# Patient Record
Sex: FEMALE | Race: WHITE | URBAN - METROPOLITAN AREA
[De-identification: names, ages, dates, MRNs, and addresses within clinical notes are randomized per-mention and may not be internally consistent; named-entity substitution may affect disease eponyms.]

---

## 2017-01-19 ENCOUNTER — OFFICE VISIT (OUTPATIENT)
Dept: INTERNAL MEDICINE CLINIC | Age: 62
End: 2017-01-19

## 2017-01-19 VITALS
HEIGHT: 64 IN | OXYGEN SATURATION: 98 % | RESPIRATION RATE: 17 BRPM | DIASTOLIC BLOOD PRESSURE: 89 MMHG | HEART RATE: 85 BPM | WEIGHT: 221.6 LBS | BODY MASS INDEX: 37.83 KG/M2 | TEMPERATURE: 98 F | SYSTOLIC BLOOD PRESSURE: 158 MMHG

## 2017-01-19 DIAGNOSIS — M54.2 NECK PAIN ON RIGHT SIDE: ICD-10-CM

## 2017-01-19 DIAGNOSIS — E78.00 PURE HYPERCHOLESTEROLEMIA: Primary | ICD-10-CM

## 2017-01-19 DIAGNOSIS — S93.401A SPRAIN OF RIGHT ANKLE, UNSPECIFIED LIGAMENT, INITIAL ENCOUNTER: ICD-10-CM

## 2017-01-19 DIAGNOSIS — R73.9 ELEVATED BLOOD SUGAR: ICD-10-CM

## 2017-01-19 DIAGNOSIS — Z11.59 NEED FOR HEPATITIS C SCREENING TEST: ICD-10-CM

## 2017-01-19 RX ORDER — NAPROXEN SODIUM 220 MG
220 TABLET ORAL 2 TIMES DAILY WITH MEALS
COMMUNITY
End: 2018-10-03 | Stop reason: ALTCHOICE

## 2017-01-19 RX ORDER — METHYLPREDNISOLONE 4 MG/1
TABLET ORAL
Qty: 1 DOSE PACK | Refills: 0 | Status: SHIPPED | OUTPATIENT
Start: 2017-01-19 | End: 2017-09-12 | Stop reason: ALTCHOICE

## 2017-01-19 NOTE — PROGRESS NOTES
Subjective:   Tracy Hernandez is a 59 y.o. female who is seen for follow up of hyperlipidemia. Cardiovascular risk analysis - 59 y.o. female LDL goal is under 130. Compliance with treatment thus far has been good. ROS: taking medications as instructed, no medication side effects noted, no TIA's, no chest pain on exertion, no dyspnea on exertion, no swelling of ankles, no orthostatic dizziness or lightheadedness, no palpitations. New concerns:   Continued neck pain send middle of November. Right sided with some right jaw numbness but no radiation to arm. Worse turning head to right and looking down. Went to PT with some improvement but numbness has remained the same. Feels medrol dose pack helped some. Low back pain has improved. Feels cymbalta has helped this. Elevated blood pressures in the past.  Not exercising but diet has improved. Denies increased thirst but awakens at night with a dry month. Feel 10 days ago in [facility] rolling right ankle. Swelling but no bruising. Mild pain the first night but improved. Current Outpatient Prescriptions   Medication Sig Dispense Refill    naproxen sodium (ALEVE) 220 mg tablet Take 220 mg by mouth two (2) times daily (with meals).  DULoxetine (CYMBALTA) 60 mg capsule Take 1 Cap by mouth daily. 30 Cap 5    simvastatin (ZOCOR) 20 mg tablet TAKE 1 TABLET BY MOUTH EVERY NIGHT 30 Tab 11    triamcinolone (NASACORT AQ) 55 mcg nasal inhaler 2 Sprays daily.  Cholecalciferol, Vitamin D3, (VITAMIN D3) 1,000 unit cap Take 4,000 Units by mouth daily.  fexofenadine (ALLEGRA) 180 mg tablet Take  by mouth daily.  aspirin 81 mg tablet Take 81 mg by mouth.         Lab Results  Component Value Date/Time   Cholesterol, total 210 07/12/2016 09:02 AM   HDL Cholesterol 51 07/12/2016 09:02 AM   LDL, calculated 135 07/12/2016 09:02 AM   Triglyceride 118 07/12/2016 09:02 AM       Lab Results  Component Value Date/Time   ALT 21 07/12/2016 09:02 AM   AST 20 07/12/2016 09:02 AM   Alk. phosphatase 94 07/12/2016 09:02 AM   Bilirubin, direct 0.09 06/24/2015 10:03 AM   Bilirubin, total 0.4 07/12/2016 09:02 AM          Objective:     Visit Vitals    /89 (BP 1 Location: Left arm, BP Patient Position: Sitting)    Pulse 85    Temp 98 °F (36.7 °C) (Oral)    Resp 17    Ht 5' 4\" (1.626 m)    Wt 221 lb 9.6 oz (100.5 kg)    SpO2 98%    BMI 38.04 kg/m2      Appearance: alert, well appearing, and in no distress. CVS exam       Assessment/Plan:   Hyperlipidemia well controlled. current treatment plan is effective, no change in therapy   Check labs    Right neck strain with radiculopathy in trigeminal nerve distribution. Discussed and recommended MRI - pt declined at this time  Would like to retry medrol dose pack and continue PT at this time. Right ankle sprain - mild  Elevate, gentle stretching    Elevated blood pressure today - has not been exercises. Will restart    Low back pain - improved. Elevated blood pressure - repeat labs    HM- hep C screening, will make an appointment with gyn for pap and mammogram.    .  Orders Placed This Encounter    METABOLIC PANEL, COMPREHENSIVE    LIPID PANEL    HEMOGLOBIN A1C WITH EAG    HEPATITIS C AB    naproxen sodium (ALEVE) 220 mg tablet    methylPREDNISolone (MEDROL, AMBER,) 4 mg tablet     Follow-up Disposition:  Return in about 6 months (around 7/19/2017) for hyperlipidemia, elevated bs. Kassandra Echeverria

## 2017-01-20 LAB
ALBUMIN SERPL-MCNC: 4.6 G/DL (ref 3.6–4.8)
ALBUMIN/GLOB SERPL: 1.5 {RATIO} (ref 1.1–2.5)
ALP SERPL-CCNC: 99 IU/L (ref 39–117)
ALT SERPL-CCNC: 23 IU/L (ref 0–32)
AST SERPL-CCNC: 23 IU/L (ref 0–40)
BILIRUB SERPL-MCNC: 0.4 MG/DL (ref 0–1.2)
BUN SERPL-MCNC: 19 MG/DL (ref 8–27)
BUN/CREAT SERPL: 23 (ref 11–26)
CALCIUM SERPL-MCNC: 9.7 MG/DL (ref 8.7–10.3)
CHLORIDE SERPL-SCNC: 97 MMOL/L (ref 96–106)
CHOLEST SERPL-MCNC: 211 MG/DL (ref 100–199)
CO2 SERPL-SCNC: 28 MMOL/L (ref 18–29)
CREAT SERPL-MCNC: 0.83 MG/DL (ref 0.57–1)
EST. AVERAGE GLUCOSE BLD GHB EST-MCNC: 131 MG/DL
GLOBULIN SER CALC-MCNC: 3 G/DL (ref 1.5–4.5)
GLUCOSE SERPL-MCNC: 102 MG/DL (ref 65–99)
HBA1C MFR BLD: 6.2 % (ref 4.8–5.6)
HCV AB S/CO SERPL IA: 0.3 S/CO RATIO (ref 0–0.9)
HDLC SERPL-MCNC: 59 MG/DL
INTERPRETATION, 910389: NORMAL
LDLC SERPL CALC-MCNC: 135 MG/DL (ref 0–99)
POTASSIUM SERPL-SCNC: 5.2 MMOL/L (ref 3.5–5.2)
PROT SERPL-MCNC: 7.6 G/DL (ref 6–8.5)
SODIUM SERPL-SCNC: 140 MMOL/L (ref 134–144)
TRIGL SERPL-MCNC: 87 MG/DL (ref 0–149)
VLDLC SERPL CALC-MCNC: 17 MG/DL (ref 5–40)

## 2017-06-14 RX ORDER — SIMVASTATIN 20 MG/1
TABLET, FILM COATED ORAL
Qty: 30 TAB | Refills: 11 | Status: SHIPPED | OUTPATIENT
Start: 2017-06-14 | End: 2017-11-07 | Stop reason: SDUPTHER

## 2017-06-14 RX ORDER — DULOXETIN HYDROCHLORIDE 60 MG/1
60 CAPSULE, DELAYED RELEASE ORAL DAILY
Qty: 30 CAP | Refills: 3 | Status: SHIPPED | OUTPATIENT
Start: 2017-06-14 | End: 2017-10-05 | Stop reason: SDUPTHER

## 2017-09-12 ENCOUNTER — OFFICE VISIT (OUTPATIENT)
Dept: INTERNAL MEDICINE CLINIC | Age: 62
End: 2017-09-12

## 2017-09-12 VITALS
WEIGHT: 223 LBS | HEART RATE: 79 BPM | RESPIRATION RATE: 18 BRPM | TEMPERATURE: 97.9 F | BODY MASS INDEX: 38.07 KG/M2 | DIASTOLIC BLOOD PRESSURE: 79 MMHG | OXYGEN SATURATION: 98 % | HEIGHT: 64 IN | SYSTOLIC BLOOD PRESSURE: 115 MMHG

## 2017-09-12 DIAGNOSIS — E78.00 PURE HYPERCHOLESTEROLEMIA: ICD-10-CM

## 2017-09-12 DIAGNOSIS — R73.9 ELEVATED BLOOD SUGAR: ICD-10-CM

## 2017-09-12 DIAGNOSIS — Z00.00 ROUTINE GENERAL MEDICAL EXAMINATION AT A HEALTH CARE FACILITY: Primary | ICD-10-CM

## 2017-09-12 NOTE — PROGRESS NOTES
Subjective:   57 y.o. female for Well Woman Check. Her gyne and breast care is done elsewhere by her Ob-Gyne physician. Patient Active Problem List    Diagnosis Date Noted    Elevated blood sugar 2017    Advance directive discussed with patient 2016    Hyperlipemia 2011    Panic attacks 2011     Current Outpatient Prescriptions   Medication Sig Dispense Refill    simvastatin (ZOCOR) 20 mg tablet TAKE 1 TABLET BY MOUTH EVERY NIGHT 30 Tab 11    DULoxetine (CYMBALTA) 60 mg capsule Take 1 Cap by mouth daily. 30 Cap 3    naproxen sodium (ALEVE) 220 mg tablet Take 220 mg by mouth two (2) times daily (with meals).  triamcinolone (NASACORT AQ) 55 mcg nasal inhaler 2 Sprays daily.  Cholecalciferol, Vitamin D3, (VITAMIN D3) 1,000 unit cap Take 4,000 Units by mouth daily.  fexofenadine (ALLEGRA) 180 mg tablet Take  by mouth daily.  aspirin 81 mg tablet Take 81 mg by mouth.        No Known Allergies  Past Medical History:   Diagnosis Date    Hypercholesterolemia     Panic attacks      Past Surgical History:   Procedure Laterality Date    ENDOSCOPY, COLON, DIAGNOSTIC       Family History   Problem Relation Age of Onset    Cancer Mother      lung ( @ 67)   [facility] Obesity Mother     Hypertension Mother     High Cholesterol Mother     Elevated Lipids Mother     Cancer Father      prostate    Parkinsonism Father      Social History   Substance Use Topics    Smoking status: Former Smoker     Packs/day: 20.00     Types: Cigarettes    Smokeless tobacco: Never Used    Alcohol use 0.0 - 0.6 oz/week     0 - 1 Standard drinks or equivalent per week        Lab Results  Component Value Date/Time   WBC 7.5 2017 11:21 AM   HGB 13.9 2017 11:21 AM   HCT 43.3 2017 11:21 AM   PLATELET 964  11:21 AM   MCV 89 2017 11:21 AM     Lab Results  Component Value Date/Time   Hemoglobin A1c 5.9 2017 11:21 AM   Hemoglobin A1c 6.2 2017 12:09 PM Glucose 96 09/08/2017 11:21 AM   LDL, calculated 106 09/08/2017 11:21 AM   Creatinine 0.87 09/08/2017 11:21 AM      Lab Results  Component Value Date/Time   Cholesterol, total 177 09/08/2017 11:21 AM   HDL Cholesterol 53 09/08/2017 11:21 AM   LDL, calculated 106 09/08/2017 11:21 AM   Triglyceride 90 09/08/2017 11:21 AM   Lab Results  Component Value Date/Time   ALT (SGPT) 18 09/08/2017 11:21 AM   AST (SGOT) 19 09/08/2017 11:21 AM   Alk. phosphatase 92 09/08/2017 11:21 AM   Bilirubin, direct 0.09 06/24/2015 10:03 AM   Bilirubin, total 0.4 09/08/2017 11:21 AM   Albumin 4.3 09/08/2017 11:21 AM   Protein, total 7.4 09/08/2017 11:21 AM   PLATELET 092 70/31/9900 11:21 AM       Lab Results  Component Value Date/Time   GFR est non-AA 71 09/08/2017 11:21 AM   GFR est AA 81 09/08/2017 11:21 AM   Creatinine 0.87 09/08/2017 11:21 AM   BUN 15 09/08/2017 11:21 AM   Sodium 144 09/08/2017 11:21 AM   Potassium 5.2 09/08/2017 11:21 AM   Chloride 99 09/08/2017 11:21 AM   CO2 27 09/08/2017 11:21 AM   Lab Results  Component Value Date/Time   TSH 1.370 09/08/2017 11:21 AM       Specific concerns today: Cholesterol and blood sugar has improved on labs from 7 months ago. Trying to cut back on caloric intake. Just got a puppy so will be walking more. New spots on bottom lip. No bleeding. Has seen Dr. Kirt Mobley in the past but hasn't seen for years. Last pap 12/2016? With Dr. Dagoberto Espinal      Review of Systems  Constitutional: negative  Eyes: negative except for contacts/glasses and last eye exam 2-3 years ago  Ears, nose, mouth, throat, and face: negative  Respiratory: negative  Cardiovascular: negative  Gastrointestinal: negative except for reflux symptoms  Genitourinary:negative  Integument/breast: negative  Hematologic/lymphatic: negative  Musculoskeletal:negative except for bilat knee pain, left worse than right. Aching alot - advil help  Neurological: negative except for left hand numbness - when grasping.   used to sleep with a brace which helps. Feet numb in winter when cold or shoes too tight. Behavioral/Psych: negative  Endocrine: negative  Allergic/Immunologic: negative except for allergies - controlled with current meds    Objective:   Blood pressure 115/79, pulse 79, temperature 97.9 °F (36.6 °C), temperature source Oral, resp. rate 18, height 5' 4\" (1.626 m), weight 223 lb (101.2 kg), SpO2 98 %.    Physical Examination:   General appearance - alert, well appearing, and in no distress and oriented to person, place, and time  Mental status - alert, oriented to person, place, and time, normal mood, behavior, speech, dress, motor activity, and thought processes  Eyes - pupils equal and reactive, extraocular eye movements intact  Ears - bilateral TM's and external ear canals normal  Nose - normal and patent, no erythema, discharge or polyps  Mouth - mucous membranes moist, pharynx normal without lesions  Neck - supple, no significant adenopathy, carotids upstroke normal bilaterally, no bruits, thyroid exam: thyroid is normal in size without nodules or tenderness  Lymphatics - no palpable lymphadenopathy, no hepatosplenomegaly  Chest - clear to auscultation, no wheezes, rales or rhonchi, symmetric air entry  Heart - normal rate, regular rhythm, normal S1, S2, no murmurs, rubs, clicks or gallops  Abdomen - soft, nontender, nondistended, no masses or organomegaly  bowel sounds normal  Breasts - breasts appear normal, no suspicious masses, no skin or nipple changes or axillary nodes  Back exam - full range of motion, no tenderness, palpable spasm or pain on motion  Neurological - alert, oriented, normal speech, no focal findings or movement disorder noted  Musculoskeletal - no joint tenderness, deformity or swelling  Extremities - peripheral pulses normal, no pedal edema, no clubbing or cyanosis  Skin - normal coloration and turgor, no rashes, no suspicious skin lesions noted     Assessment/Plan:   56yo female  Elevated bs - improved, discussed continuing better diet and exercise  Hyperlipidemia - controlled, cont same  Anxiety - controlled, cont same  Obesity - discussed weight loss through diet and exercise  Skin changes on lip - referred back to derm. call if any problems  Labs prior to next visit. Orders Placed This Encounter    METABOLIC PANEL, COMPREHENSIVE    LIPID PANEL    HEMOGLOBIN A1C WITH EAG     Follow-up Disposition:  Return in about 6 months (around 3/12/2018) for hyperlipidemia.

## 2017-11-07 RX ORDER — SIMVASTATIN 20 MG/1
TABLET, FILM COATED ORAL
Qty: 90 TAB | Refills: 3 | Status: SHIPPED | OUTPATIENT
Start: 2017-11-07 | End: 2018-11-15 | Stop reason: SDUPTHER

## 2017-11-07 NOTE — TELEPHONE ENCOUNTER
Last seen: 09/12/17    Requested Prescriptions     Pending Prescriptions Disp Refills    simvastatin (ZOCOR) 20 mg tablet 90 Tab 3     Sig: TAKE 1 TABLET BY MOUTH EVERY NIGHT

## 2018-03-08 LAB
ALBUMIN SERPL-MCNC: 4.3 G/DL (ref 3.6–4.8)
ALBUMIN/GLOB SERPL: 1.5 {RATIO} (ref 1.2–2.2)
ALP SERPL-CCNC: 97 IU/L (ref 39–117)
ALT SERPL-CCNC: 17 IU/L (ref 0–32)
AST SERPL-CCNC: 21 IU/L (ref 0–40)
BILIRUB SERPL-MCNC: 0.4 MG/DL (ref 0–1.2)
BUN SERPL-MCNC: 14 MG/DL (ref 8–27)
BUN/CREAT SERPL: 18 (ref 12–28)
CALCIUM SERPL-MCNC: 9.6 MG/DL (ref 8.7–10.3)
CHLORIDE SERPL-SCNC: 99 MMOL/L (ref 96–106)
CHOLEST SERPL-MCNC: 194 MG/DL (ref 100–199)
CO2 SERPL-SCNC: 27 MMOL/L (ref 18–29)
CREAT SERPL-MCNC: 0.8 MG/DL (ref 0.57–1)
EST. AVERAGE GLUCOSE BLD GHB EST-MCNC: 123 MG/DL
GFR SERPLBLD CREATININE-BSD FMLA CKD-EPI: 78 ML/MIN/1.73
GFR SERPLBLD CREATININE-BSD FMLA CKD-EPI: 90 ML/MIN/1.73
GLOBULIN SER CALC-MCNC: 2.9 G/DL (ref 1.5–4.5)
GLUCOSE SERPL-MCNC: 119 MG/DL (ref 65–99)
HBA1C MFR BLD: 5.9 % (ref 4.8–5.6)
HDLC SERPL-MCNC: 49 MG/DL
INTERPRETATION, 910389: NORMAL
LDLC SERPL CALC-MCNC: 126 MG/DL (ref 0–99)
POTASSIUM SERPL-SCNC: 5 MMOL/L (ref 3.5–5.2)
PROT SERPL-MCNC: 7.2 G/DL (ref 6–8.5)
SODIUM SERPL-SCNC: 141 MMOL/L (ref 134–144)
TRIGL SERPL-MCNC: 94 MG/DL (ref 0–149)
VLDLC SERPL CALC-MCNC: 19 MG/DL (ref 5–40)

## 2018-03-12 DIAGNOSIS — R73.9 ELEVATED BLOOD SUGAR: ICD-10-CM

## 2018-03-12 DIAGNOSIS — E78.00 PURE HYPERCHOLESTEROLEMIA: ICD-10-CM

## 2018-03-27 ENCOUNTER — OFFICE VISIT (OUTPATIENT)
Dept: INTERNAL MEDICINE CLINIC | Age: 63
End: 2018-03-27

## 2018-03-27 VITALS
HEART RATE: 87 BPM | SYSTOLIC BLOOD PRESSURE: 155 MMHG | BODY MASS INDEX: 38.38 KG/M2 | DIASTOLIC BLOOD PRESSURE: 87 MMHG | WEIGHT: 224.8 LBS | TEMPERATURE: 98.5 F | OXYGEN SATURATION: 98 % | RESPIRATION RATE: 18 BRPM | HEIGHT: 64 IN

## 2018-03-27 DIAGNOSIS — J02.9 SORE THROAT: Primary | ICD-10-CM

## 2018-03-27 DIAGNOSIS — R73.9 ELEVATED BLOOD SUGAR: ICD-10-CM

## 2018-03-27 DIAGNOSIS — E78.00 PURE HYPERCHOLESTEROLEMIA: ICD-10-CM

## 2018-03-27 LAB
S PYO AG THROAT QL: NEGATIVE
VALID INTERNAL CONTROL?: YES

## 2018-03-27 RX ORDER — OMEPRAZOLE 20 MG/1
20 CAPSULE, DELAYED RELEASE ORAL DAILY
COMMUNITY

## 2018-03-27 NOTE — MR AVS SNAPSHOT
[address] [place] [address] [address] [facility] 
[phone] Patient: Pita Parish MRN: [id number] VOA:[birth year] Visit Information Date & Time Provider Department Dept. Phone Encounter #  
 3/27/2018 10:00 AM Lalo Dunn MD [facility] [phone] [id number] Follow-up Instructions Return in about 6 months (around 9/27/2018) for hyperlipidemia, elevated blood sugar. Primitivo Mishra Upcoming Health Maintenance Date Due  
 PAP AKA CERVICAL CYTOLOGY 8/12/2016 Influenza Age 5 to Adult 8/1/2017 BREAST CANCER SCRN MAMMOGRAM 1/20/2018 GLAUCOMA SCREENING Q2Y 3/8/2018 Bone Densitometry (Dexa) Screening 3/8/2018 Pneumococcal 65+ Low/Medium Risk (1 of 2 - PCV13) 3/8/2018 DTaP/Tdap/Td series (2 - Td) 11/21/2021 COLONOSCOPY 9/29/2024 Allergies as of 3/27/2018  Review Complete On: 3/27/2018 By: Lalo Dunn MD  
 No Known Allergies Current Immunizations  Reviewed on 12/12/2016 Name Date Influenza Vaccine 11/15/2017, 12/8/2016, 12/1/2015, 11/19/2014, 10/9/2013 Influenza Vaccine Whole 10/5/2011, 11/2/2010 TDAP Vaccine 11/21/2011 12:11 PM  
 Zoster Vaccine, Live 6/9/2013 Not reviewed this visit You Were Diagnosed With   
  
 Codes Comments Sore throat    -  Primary ICD-10-CM: J02.9 ICD-9-CM: 812 Elevated blood sugar     ICD-10-CM: R73.9 ICD-9-CM: 790.29 Pure hypercholesterolemia     ICD-10-CM: E78.00 ICD-9-CM: 272.0 Vitals BP Pulse Temp Resp Height(growth percentile) Weight(growth percentile) 155/87 (BP 1 Location: Right arm, BP Patient Position: Sitting) 87 98.5 °F (36.9 °C) (Oral) 18 5' 4\" (1.626 m) 224 lb 12.8 oz (102 kg) SpO2 BMI OB Status Smoking Status 98% 38.59 kg/m2 Postmenopausal Former Smoker Vitals History BMI and BSA Data Body Mass Index Body Surface Area 38.59 kg/m 2 2.15 m 2 Preferred Pharmacy Pharmacy Name Phone [facility] #[id number] Your Updated Medication List  
  
   
This list is accurate as of 3/27/18 11:05 AM.  Always use your most recent med list.  
  
  
  
  
 ALEVE 220 mg tablet Generic drug:  naproxen sodium Take 220 mg by mouth two (2) times daily (with meals). aspirin 81 mg tablet Take 81 mg by mouth. DULoxetine 60 mg capsule Commonly known as:  CYMBALTA TAKE ONE CAPSULE BY MOUTH EVERY DAY  
  
 NASACORT AQ 55 mcg nasal inhaler Generic drug:  triamcinolone 2 Sprays daily. PriLOSEC 20 mg capsule Generic drug:  omeprazole Take 20 mg by mouth daily. simvastatin 20 mg tablet Commonly known as:  ZOCOR  
TAKE 1 TABLET BY MOUTH EVERY NIGHT  
  
 VITAMIN D3 1,000 unit Cap Generic drug:  cholecalciferol Take 4,000 Units by mouth daily. ZyrTEC 10 mg Cap Generic drug:  Cetirizine Take  by mouth. We Performed the Following AMB POC RAPID STREP A [36293 CPT(R)] Follow-up Instructions Return in about 6 months (around 9/27/2018) for hyperlipidemia, elevated blood sugar. .  
  
  
Patient Instructions Continue Zyrtec and Nasacort. Add Mucinex 1200mg twice a day (plain not D or DM) Add saline nasal spray 2 squirts each nostril 2-3 times a day. Introducing [facility]! Dear Minal Ramon: 
Thank you for requesting a [facility] account. Our records indicate that you already have an active [employer] account. You can access your account anytime at [email or web address] Did you know that you can access your hospital and ER discharge instructions at any time in [employer]? You can also review all of your test results from your hospital stay or ER visit. Additional Information If you have questions, please visit the Frequently Asked Questions section of the [employer] website at [email or web address] Remember, MyChart is NOT to be used for urgent needs. For medical emergencies, dial 911. Now available from your iPhone and Android! Please provide this summary of care documentation to your next provider. Your primary care clinician is listed as RODOLFO OLVERA. If you have any questions after today's visit, please call [phone].

## 2018-03-27 NOTE — PATIENT INSTRUCTIONS
Continue Zyrtec and Nasacort. Add Mucinex 1200mg twice a day (plain not D or DM)  Add saline nasal spray 2 squirts each nostril 2-3 times a day.

## 2018-03-27 NOTE — PROGRESS NOTES
Subjective:   Mingo Saldana is a 70 y.o. female who is seen for follow up of hyperlipidemia and elevated blood sugars. Cardiovascular risk analysis - 70 y.o. female LDL goal is under 130. Compliance with treatment thus far has been good. ROS: taking medications as instructed, no medication side effects noted, no TIA's, no chest pain on exertion, no dyspnea on exertion, no swelling of ankles, no orthostatic dizziness or lightheadedness, no palpitations. New concerns:   Started 3/23 with severe sore throat and post nasal drainage. Drainage is clear. Mild cough. Initial fever but none since that first day. Taking Aleve but no other otc medications. .   Elevated BS - dry month at night but relates to Zyrtec. Denies increased urination, numbness in feet or vision changes. A1C is stable at 5.9%  Blood pressure elevated today. Nml 2months ago at gyn Magdalenohumaira Bryant) 120/70  Obesity - not working to lose at this time. Walking dog (mini labradoodle) for exercise. Current Outpatient Prescriptions   Medication Sig Dispense Refill    Cetirizine (ZYRTEC) 10 mg cap Take  by mouth.  omeprazole (PRILOSEC) 20 mg capsule Take 20 mg by mouth daily.  simvastatin (ZOCOR) 20 mg tablet TAKE 1 TABLET BY MOUTH EVERY NIGHT 90 Tab 3    DULoxetine (CYMBALTA) 60 mg capsule TAKE ONE CAPSULE BY MOUTH EVERY DAY 30 Cap 11    naproxen sodium (ALEVE) 220 mg tablet Take 220 mg by mouth two (2) times daily (with meals).  triamcinolone (NASACORT AQ) 55 mcg nasal inhaler 2 Sprays daily.  Cholecalciferol, Vitamin D3, (VITAMIN D3) 1,000 unit cap Take 4,000 Units by mouth daily.  aspirin 81 mg tablet Take 81 mg by mouth.         Lab Results  Component Value Date/Time   Cholesterol, total 194 03/07/2018 09:04 AM   HDL Cholesterol 49 03/07/2018 09:04 AM   LDL, calculated 126 (H) 03/07/2018 09:04 AM   Triglyceride 94 03/07/2018 09:04 AM     Lab Results  Component Value Date/Time   ALT (SGPT) 17 03/07/2018 09:04 AM AST (SGOT) 21 03/07/2018 09:04 AM   Alk. phosphatase 97 03/07/2018 09:04 AM   Bilirubin, direct 0.09 06/24/2015 10:03 AM   Bilirubin, total 0.4 03/07/2018 09:04 AM   Albumin 4.3 03/07/2018 09:04 AM   Protein, total 7.2 03/07/2018 09:04 AM   PLATELET 451 84/54/7253 11:21 AM          Objective:     Visit Vitals    /87 (BP 1 Location: Right arm, BP Patient Position: Sitting)    Pulse 87    Temp 98.5 °F (36.9 °C) (Oral)    Resp 18    Ht 5' 4\" (1.626 m)    Wt 224 lb 12.8 oz (102 kg)    SpO2 98%    BMI 38.59 kg/m2      Appearance: alert, well appearing, and in no distress and oriented to person, place, and time. CVS exam   Nares - clear dischage  Sinuses nontender  OP - post erythema, ulceration upper soft palate. Tonsils are not enlarged and without exudate  NECK: supple, no lad, no bruit, no tm  LUNGS: cta bilat  CV rrr, no m/g/r  ABD: soft, nt, nd, nabs  EXT: no c/c/e. Tatyana Tobar Assessment/Plan:   Hyperlipidemia well controlled. current treatment plan is effective, no change in therapy. Elevated blood sugar - controlled  Continue to work on diet and exercise    Obesity -discussed weight loss through diet and exercise    Viral uri - mucinex and saline ns. Discussed no need for abx. Strep test negative. Orders Placed This Encounter    AMB POC RAPID STREP A    Cetirizine (ZYRTEC) 10 mg cap    omeprazole (PRILOSEC) 20 mg capsule     Follow-up Disposition:  Return in about 6 months (around 9/27/2018) for hyperlipidemia, elevated blood sugar.   ..

## 2018-03-27 NOTE — PROGRESS NOTES
Chief Complaint   Patient presents with    Follow Up Chronic Condition     Lipids     1. Have you been to the ER, urgent care clinic since your last visit? Hospitalized since your last visit? No    2. Have you seen or consulted any other health care providers outside of the [address] [place] [address] since your last visit? Include any pap smears or colon screening. Pap done by Dr. Tracey Floyd- 2 months ago, due for mammo and bone density.

## 2018-10-03 ENCOUNTER — OFFICE VISIT (OUTPATIENT)
Dept: INTERNAL MEDICINE CLINIC | Age: 63
End: 2018-10-03

## 2018-10-03 VITALS
RESPIRATION RATE: 18 BRPM | HEIGHT: 64 IN | WEIGHT: 226.4 LBS | OXYGEN SATURATION: 95 % | BODY MASS INDEX: 38.65 KG/M2 | SYSTOLIC BLOOD PRESSURE: 132 MMHG | DIASTOLIC BLOOD PRESSURE: 75 MMHG | TEMPERATURE: 97.4 F | HEART RATE: 79 BPM

## 2018-10-03 DIAGNOSIS — E66.01 SEVERE OBESITY (HCC): ICD-10-CM

## 2018-10-03 DIAGNOSIS — Z23 ENCOUNTER FOR IMMUNIZATION: ICD-10-CM

## 2018-10-03 DIAGNOSIS — Z00.00 ROUTINE GENERAL MEDICAL EXAMINATION AT A HEALTH CARE FACILITY: Primary | ICD-10-CM

## 2018-10-03 RX ORDER — DEXTROMETHORPHAN HYDROBROMIDE, GUAIFENESIN 5; 100 MG/5ML; MG/5ML
650 LIQUID ORAL
COMMUNITY

## 2018-10-03 NOTE — MR AVS SNAPSHOT
[address] [place] [address] Drive [address] [place] [address] 
[phone] Patient: Jared Swan MRN: [id number] ZUP:0/1/4277 Visit Information Date & Time Provider Department Dept. Phone Encounter #  
 10/3/2018  9:20 Mackenzie Mosqueda MD Counts include 234 beds at the [facility] [phone] [id number] Upcoming Health Maintenance Date Due Shingrix Vaccine Age 50> (1 of 2) 3/8/2003 BREAST CANCER SCRN MAMMOGRAM 1/20/2018 GLAUCOMA SCREENING Q2Y 3/8/2018 Bone Densitometry (Dexa) Screening 3/8/2018 Pneumococcal 65+ Low/Medium Risk (1 of 2 - PCV13) 3/8/2018 Influenza Age 5 to Adult 8/1/2018 DTaP/Tdap/Td series (2 - Td) 11/21/2021 COLONOSCOPY 9/29/2024 Allergies as of 10/3/2018  Review Complete On: 10/3/2018 By: Nga Palma MD  
 No Known Allergies Current Immunizations  Reviewed on 12/12/2016 Name Date Influenza Vaccine 11/15/2017, 12/8/2016, 12/1/2015, 11/19/2014, 10/9/2013 Influenza Vaccine (Quad) PF  Incomplete Influenza Vaccine Whole 10/5/2011, 11/2/2010 TDAP Vaccine 11/21/2011 12:11 PM  
 Zoster Vaccine, Live 6/9/2013 Not reviewed this visit You Were Diagnosed With   
  
 Codes Comments Routine general medical examination at a health care facility    -  Primary ICD-10-CM: Z00.00 ICD-9-CM: V70.0 Encounter for immunization     ICD-10-CM: T13 ICD-9-CM: V03.89 Vitals BP Pulse Temp Resp Height(growth percentile) Weight(growth percentile) 132/75 79 97.4 °F (36.3 °C) (Oral) 18 5' 4\" (1.626 m) 226 lb 6.4 oz (102.7 kg) SpO2 BMI OB Status Smoking Status 95% 38.86 kg/m2 Postmenopausal Former Smoker Vitals History BMI and BSA Data Body Mass Index Body Surface Area  
 38.86 kg/m 2 2.15 m 2 Preferred Pharmacy Pharmacy Name Phone [id number] [address] Your Updated Medication List  
  
   
 This list is accurate as of 10/3/18 10:05 AM.  Always use your most recent med list.  
  
  
  
  
 aspirin 81 mg tablet Take 81 mg by mouth. DULoxetine 60 mg capsule Commonly known as:  CYMBALTA TAKE ONE CAPSULE BY MOUTH EVERY DAY  
  
 NASACORT AQ 55 mcg nasal inhaler Generic drug:  triamcinolone 2 Sprays daily. pneumococcal 13 audrey conj dip 0.5 mL Syrg injection Commonly known as:  PREVNAR 13 (PF)  
0.5 mL by IntraMUSCular route PRIOR TO DISCHARGE for 1 dose. PriLOSEC 20 mg capsule Generic drug:  omeprazole Take 20 mg by mouth daily. simvastatin 20 mg tablet Commonly known as:  ZOCOR  
TAKE 1 TABLET BY MOUTH EVERY NIGHT  
  
 TYLENOL ARTHRITIS PAIN 650 mg Tber Generic drug:  acetaminophen Take 650 mg by mouth two (2) times a day. varicella-zoster recombinant (PF) 50 mcg/0.5 mL Susr injection Commonly known as:  SHINGRIX (PF)  
0.5 mL by IntraMUSCular route once for 1 dose. Repeat x 1 in 2-6 months VITAMIN D3 1,000 unit Cap Generic drug:  cholecalciferol Take 4,000 Units by mouth daily. ZyrTEC 10 mg Cap Generic drug:  Cetirizine Take  by mouth. Prescriptions Printed Refills  
 varicella-zoster recombinant, PF, (SHINGRIX, PF,) 50 mcg/0.5 mL susr injection 1 Si.5 mL by IntraMUSCular route once for 1 dose. Repeat x 1 in 2-6 months Class: Print Route: IntraMUSCular  
 pneumococcal 13 audrey conj dip (PREVNAR 13, PF,) 0.5 mL syrg injection 0 Si.5 mL by IntraMUSCular route PRIOR TO DISCHARGE for 1 dose. Class: Print Route: IntraMUSCular We Performed the Following CBC W/O DIFF [23646 CPT(R)] HEMOGLOBIN A1C WITH EAG [39199 CPT(R)] INFLUENZA VIRUS VAC QUAD,SPLIT,PRESV FREE SYRINGE IM [id number] CPT(R)] LIPID PANEL [98486 CPT(R)] METABOLIC PANEL, COMPREHENSIVE [26411 CPT(R)] TSH 3RD GENERATION [72388 CPT(R)] VITAMIN D, 25 HYDROXY [id number] CPT(R)] Introducing [facility]! Dear Dwight Davila: 
Thank you for requesting a [employer] account. Our records indicate that you already have an active [employer] account. You can access your account anytime at [email or web address] Did you know that you can access your hospital and ER discharge instructions at any time in [employer]? You can also review all of your test results from your hospital stay or ER visit. Additional Information If you have questions, please visit the Frequently Asked Questions section of the [employer] website at [email or web address] Remember, [employer] is NOT to be used for urgent needs. For medical emergencies, dial 911. Now available from your iPhone and Android! Please provide this summary of care documentation to your next provider. Your primary care clinician is listed as RODOLFO OLVERA. If you have any questions after today's visit, please call [phone].

## 2018-10-03 NOTE — PROGRESS NOTES
Subjective:   70 y.o. female for Well Woman Check. Her gyne and breast care is done elsewhere by her Ob-Gyne physician. Saw Dr. Luca Blanca this year. She has scheduled her for a mammogram and DXA but not yet completed. Patient Active Problem List    Diagnosis Date Noted    Severe obesity ([address] 10/03/2018    Elevated blood sugar 2017    Advance directive discussed with patient 2016    Hyperlipemia 2011    Panic attacks 2011     Current Outpatient Prescriptions   Medication Sig Dispense Refill    acetaminophen (TYLENOL ARTHRITIS PAIN) 650 mg TbER Take 650 mg by mouth two (2) times a day.  varicella-zoster recombinant, PF, (SHINGRIX, PF,) 50 mcg/0.5 mL susr injection 0.5 mL by IntraMUSCular route once for 1 dose. Repeat x 1 in 2-6 months 0.5 mL 1    pneumococcal 13 audrey conj dip (PREVNAR 13, PF,) 0.5 mL syrg injection 0.5 mL by IntraMUSCular route PRIOR TO DISCHARGE for 1 dose. 0.5 mL 0    DULoxetine (CYMBALTA) 60 mg capsule TAKE ONE CAPSULE BY MOUTH EVERY DAY 30 Cap 11    Cetirizine (ZYRTEC) 10 mg cap Take  by mouth.  omeprazole (PRILOSEC) 20 mg capsule Take 20 mg by mouth daily.  simvastatin (ZOCOR) 20 mg tablet TAKE 1 TABLET BY MOUTH EVERY NIGHT 90 Tab 3    triamcinolone (NASACORT AQ) 55 mcg nasal inhaler 2 Sprays daily.  Cholecalciferol, Vitamin D3, (VITAMIN D3) 1,000 unit cap Take 4,000 Units by mouth daily.  aspirin 81 mg tablet Take 81 mg by mouth.        No Known Allergies  Past Medical History:   Diagnosis Date    Hypercholesterolemia     Panic attacks      Past Surgical History:   Procedure Laterality Date    ENDOSCOPY, COLON, DIAGNOSTIC  , 2014    Brand     Family History   Problem Relation Age of Onset    Cancer Mother      lung ( @ 67)   Orlene Gala Obesity Mother     Hypertension Mother     High Cholesterol Mother     Elevated Lipids Mother     Cancer Father      prostate    Parkinsonism Father      Social History   Substance Use Topics  Smoking status: Former Smoker     Packs/day: 20.00     Types: Cigarettes    Smokeless tobacco: Never Used    Alcohol use No             Specific concerns today:   Increasing arthritis in left knee. Has seen Dr. Lm An in the past.  Pain in medial knee and stiffness. Review of Systems  Constitutional: negative  Eyes: negative  Ears, nose, mouth, throat, and face: negative  Respiratory: negative  Cardiovascular: negative except for palpitations  Gastrointestinal: negative except for indigestion when taking too much aleve - stopped and started some prilosec. Genitourinary:negative  Integument/breast: negative  Hematologic/lymphatic: negative  Musculoskeletal:negative except for left knee  Neurological: negative  Behavioral/Psych: negative  Endocrine: negative  Allergic/Immunologic: negative  Dry mouth at night post taking antihistamine. Objective:   Blood pressure 132/75, pulse 79, temperature 97.4 °F (36.3 °C), temperature source Oral, resp. rate 18, height 5' 4\" (1.626 m), weight 226 lb 6.4 oz (102.7 kg), SpO2 95 %.    Physical Examination:   General appearance - alert, well appearing, and in no distress and overweight  Mental status - alert, oriented to person, place, and time  Eyes - pupils equal and reactive, extraocular eye movements intact  Ears - bilateral TM's and external ear canals normal  Nose - normal and patent, no erythema, discharge or polyps  Mouth - mucous membranes moist, pharynx normal without lesions  Neck - supple, no significant adenopathy, carotids upstroke normal bilaterally, no bruits, thyroid exam: thyroid is normal in size without nodules or tenderness  Lymphatics - no palpable lymphadenopathy, no hepatosplenomegaly  Chest - clear to auscultation, no wheezes, rales or rhonchi, symmetric air entry  Heart - normal rate, regular rhythm, normal S1, S2, no murmurs, rubs, clicks or gallops  Abdomen - soft, nontender, nondistended, no masses or organomegaly  bowel sounds normal  Breasts - breasts appear normal, no suspicious masses, no skin or nipple changes or axillary nodes  Back exam - full range of motion, no tenderness, palpable spasm or pain on motion  Neurological - alert, oriented, normal speech, no focal findings or movement disorder noted  Musculoskeletal - no joint tenderness, deformity or swelling, left knee with crepitance and decreased extension and flexion on rom. Extremities - peripheral pulses normal, no pedal edema, no clubbing or cyanosis  Skin - normal coloration and turgor, no rashes, no suspicious skin lesions noted     Assessment/Plan:   68yo female  Severe obesity - discussed diet and exercise for weight loss  Left knee OA - referred back to ortho. Encouraged weight loss. Elevated BS in past - discussed better diet, repeat labs.  - check labs, flu shot admin today, rx given for Shingrix and Prevnar.     call if any problems  Orders Placed This Encounter    INFLUENZA VIRUS VACCINE QUADRIVALENT, PRESERVATIVE FREE SYRINGE (34262)    METABOLIC PANEL, COMPREHENSIVE    LIPID PANEL    HEMOGLOBIN A1C WITH EAG    CBC W/O DIFF    VITAMIN D, 25 HYDROXY    TSH 3RD GENERATION    acetaminophen (TYLENOL ARTHRITIS PAIN) 650 mg TbER    varicella-zoster recombinant, PF, (SHINGRIX, PF,) 50 mcg/0.5 mL susr injection    pneumococcal 13 audrey conj dip (PREVNAR 13, PF,) 0.5 mL syrg injection     Follow-up Disposition: Not on File

## 2018-10-03 NOTE — PROGRESS NOTES
All health maintenance and other pertinent information has been reviewed in preparation for today's office visit. Patient presents in the office today for:    Chief Complaint   Patient presents with    Complete Physical     1. Have you been to the ER, urgent care clinic since your last visit? Hospitalized since your last visit? No    2. Have you seen or consulted any other health care providers outside of the [facility] since your last visit? Include any pap smears or colon screening. No    Per provider order, Fluarix Quadrivalent Influenza Vaccine (0.5) mL was administered to the patient in the left deltoid via IM route. Patient tolerated vaccine/injection well. Patient waited for 20 minutes post injection for observation. No adverse reaction noted. All documentation completed.

## 2018-10-04 LAB
25(OH)D3+25(OH)D2 SERPL-MCNC: 59.6 NG/ML (ref 30–100)
ALBUMIN SERPL-MCNC: 4.6 G/DL (ref 3.6–4.8)
ALBUMIN/GLOB SERPL: 1.4 {RATIO} (ref 1.2–2.2)
ALP SERPL-CCNC: 104 IU/L (ref 39–117)
ALT SERPL-CCNC: 30 IU/L (ref 0–32)
AST SERPL-CCNC: 30 IU/L (ref 0–40)
BILIRUB SERPL-MCNC: 0.4 MG/DL (ref 0–1.2)
BUN SERPL-MCNC: 18 MG/DL (ref 8–27)
BUN/CREAT SERPL: 20 (ref 12–28)
CALCIUM SERPL-MCNC: 9.9 MG/DL (ref 8.7–10.3)
CHLORIDE SERPL-SCNC: 99 MMOL/L (ref 96–106)
CHOLEST SERPL-MCNC: 214 MG/DL (ref 100–199)
CO2 SERPL-SCNC: 25 MMOL/L (ref 20–29)
CREAT SERPL-MCNC: 0.89 MG/DL (ref 0.57–1)
ERYTHROCYTE [DISTWIDTH] IN BLOOD BY AUTOMATED COUNT: 14.9 % (ref 12.3–15.4)
EST. AVERAGE GLUCOSE BLD GHB EST-MCNC: 128 MG/DL
GLOBULIN SER CALC-MCNC: 3.3 G/DL (ref 1.5–4.5)
GLUCOSE SERPL-MCNC: 116 MG/DL (ref 65–99)
HBA1C MFR BLD: 6.1 % (ref 4.8–5.6)
HCT VFR BLD AUTO: 45 % (ref 34–46.6)
HDLC SERPL-MCNC: 57 MG/DL
HGB BLD-MCNC: 14.5 G/DL (ref 11.1–15.9)
INTERPRETATION, 910389: NORMAL
LDLC SERPL CALC-MCNC: 137 MG/DL (ref 0–99)
MCH RBC QN AUTO: 28.3 PG (ref 26.6–33)
MCHC RBC AUTO-ENTMCNC: 32.2 G/DL (ref 31.5–35.7)
MCV RBC AUTO: 88 FL (ref 79–97)
PLATELET # BLD AUTO: 305 X10E3/UL (ref 150–379)
POTASSIUM SERPL-SCNC: 5 MMOL/L (ref 3.5–5.2)
PROT SERPL-MCNC: 7.9 G/DL (ref 6–8.5)
RBC # BLD AUTO: 5.13 X10E6/UL (ref 3.77–5.28)
SODIUM SERPL-SCNC: 141 MMOL/L (ref 134–144)
TRIGL SERPL-MCNC: 101 MG/DL (ref 0–149)
TSH SERPL DL<=0.005 MIU/L-ACNC: 1.25 UIU/ML (ref 0.45–4.5)
VLDLC SERPL CALC-MCNC: 20 MG/DL (ref 5–40)
WBC # BLD AUTO: 6.4 X10E3/UL (ref 3.4–10.8)

## 2019-04-04 ENCOUNTER — OFFICE VISIT (OUTPATIENT)
Dept: INTERNAL MEDICINE CLINIC | Age: 64
End: 2019-04-04

## 2019-04-04 VITALS
BODY MASS INDEX: 38.51 KG/M2 | DIASTOLIC BLOOD PRESSURE: 68 MMHG | OXYGEN SATURATION: 99 % | SYSTOLIC BLOOD PRESSURE: 120 MMHG | WEIGHT: 225.6 LBS | TEMPERATURE: 98.2 F | HEIGHT: 64 IN | HEART RATE: 72 BPM

## 2019-04-04 DIAGNOSIS — E66.01 SEVERE OBESITY (HCC): ICD-10-CM

## 2019-04-04 DIAGNOSIS — E78.00 PURE HYPERCHOLESTEROLEMIA: Primary | ICD-10-CM

## 2019-04-04 DIAGNOSIS — R73.9 ELEVATED BLOOD SUGAR: ICD-10-CM

## 2019-04-04 NOTE — PROGRESS NOTES
Chief Complaint   Patient presents with    Follow-up     6 month     Visit Vitals  /82   Pulse 72   Temp 98.2 °F (36.8 °C) (Oral)   Ht 5' 4\" (1.626 m)   Wt 225 lb 9.6 oz (102.3 kg)   SpO2 99%   BMI 38.72 kg/m²     1. Have you been to the ER, urgent care clinic since your last visit? Hospitalized since your last visit? no    2. Have you seen or consulted any other health care providers outside of the [address] [place] [address] since your last visit? Include any pap smears or colon screening. No    Abuse Screening Questionnaire 4/4/2019   Do you ever feel afraid of your partner? N   Are you in a relationship with someone who physically or mentally threatens you? N   Is it safe for you to go home?  Y     3 most recent PHQ Screens 4/4/2019   Little interest or pleasure in doing things Not at all   Feeling down, depressed, irritable, or hopeless Not at all   Total Score PHQ 2 0

## 2019-04-05 ENCOUNTER — TELEPHONE (OUTPATIENT)
Dept: INTERNAL MEDICINE CLINIC | Age: 64
End: 2019-04-05

## 2019-04-05 LAB
ALBUMIN SERPL-MCNC: 4.5 G/DL (ref 3.6–4.8)
ALBUMIN/GLOB SERPL: 1.6 {RATIO} (ref 1.2–2.2)
ALP SERPL-CCNC: 96 IU/L (ref 39–117)
ALT SERPL-CCNC: 21 IU/L (ref 0–32)
AST SERPL-CCNC: 23 IU/L (ref 0–40)
BILIRUB SERPL-MCNC: 0.4 MG/DL (ref 0–1.2)
BUN SERPL-MCNC: 13 MG/DL (ref 8–27)
BUN/CREAT SERPL: 16 (ref 12–28)
CALCIUM SERPL-MCNC: 9.8 MG/DL (ref 8.7–10.3)
CHLORIDE SERPL-SCNC: 100 MMOL/L (ref 96–106)
CHOLEST SERPL-MCNC: 203 MG/DL (ref 100–199)
CO2 SERPL-SCNC: 27 MMOL/L (ref 20–29)
CREAT SERPL-MCNC: 0.83 MG/DL (ref 0.57–1)
EST. AVERAGE GLUCOSE BLD GHB EST-MCNC: 131 MG/DL
GLOBULIN SER CALC-MCNC: 2.9 G/DL (ref 1.5–4.5)
GLUCOSE SERPL-MCNC: 97 MG/DL (ref 65–99)
HBA1C MFR BLD: 6.2 % (ref 4.8–5.6)
HDLC SERPL-MCNC: 55 MG/DL
INTERPRETATION, 910389: NORMAL
LDLC SERPL CALC-MCNC: 130 MG/DL (ref 0–99)
POTASSIUM SERPL-SCNC: 4.8 MMOL/L (ref 3.5–5.2)
PROT SERPL-MCNC: 7.4 G/DL (ref 6–8.5)
SODIUM SERPL-SCNC: 142 MMOL/L (ref 134–144)
TRIGL SERPL-MCNC: 88 MG/DL (ref 0–149)
VLDLC SERPL CALC-MCNC: 18 MG/DL (ref 5–40)

## 2019-04-05 RX ORDER — SIMVASTATIN 40 MG/1
40 TABLET, FILM COATED ORAL
Qty: 90 TAB | Refills: 3 | Status: SHIPPED | OUTPATIENT
Start: 2019-04-05 | End: 2020-04-06 | Stop reason: SDUPTHER

## 2019-07-26 RX ORDER — DULOXETIN HYDROCHLORIDE 60 MG/1
CAPSULE, DELAYED RELEASE ORAL
Qty: 90 CAP | Refills: 3 | Status: SHIPPED | OUTPATIENT
Start: 2019-07-26 | End: 2020-04-06 | Stop reason: SDUPTHER

## 2019-10-08 ENCOUNTER — OFFICE VISIT (OUTPATIENT)
Dept: INTERNAL MEDICINE CLINIC | Age: 64
End: 2019-10-08

## 2019-10-08 VITALS
SYSTOLIC BLOOD PRESSURE: 132 MMHG | OXYGEN SATURATION: 96 % | TEMPERATURE: 98.3 F | WEIGHT: 216 LBS | HEART RATE: 79 BPM | BODY MASS INDEX: 36.88 KG/M2 | DIASTOLIC BLOOD PRESSURE: 76 MMHG | HEIGHT: 64 IN

## 2019-10-08 DIAGNOSIS — Z23 ENCOUNTER FOR IMMUNIZATION: ICD-10-CM

## 2019-10-08 DIAGNOSIS — R73.9 ELEVATED BLOOD SUGAR: ICD-10-CM

## 2019-10-08 DIAGNOSIS — Z00.00 ROUTINE GENERAL MEDICAL EXAMINATION AT A HEALTH CARE FACILITY: Primary | ICD-10-CM

## 2019-10-08 DIAGNOSIS — E55.9 VITAMIN D DEFICIENCY: ICD-10-CM

## 2019-10-08 DIAGNOSIS — E66.01 SEVERE OBESITY (HCC): ICD-10-CM

## 2019-10-08 NOTE — PROGRESS NOTES
Subjective:   75 y.o. female for Well Woman Check. Her gyne and breast care is done elsewhere by her Ob-Gyne physician. Patient Active Problem List    Diagnosis Date Noted    Severe obesity ([address] 10/03/2018    Elevated blood sugar 2017    Advance directive discussed with patient 2016    Hyperlipemia 2011    Panic attacks 2011     Current Outpatient Medications   Medication Sig Dispense Refill    DULoxetine (CYMBALTA) 60 mg capsule TAKE ONE CAPSULE BY MOUTH EVERY DAY 90 Cap 3    simvastatin (ZOCOR) 40 mg tablet Take 1 Tab by mouth nightly. 90 Tab 3    acetaminophen (TYLENOL ARTHRITIS PAIN) 650 mg TbER Take 650 mg by mouth two (2) times a day.  Cetirizine (ZYRTEC) 10 mg cap Take  by mouth.  omeprazole (PRILOSEC) 20 mg capsule Take 20 mg by mouth daily. As needed      triamcinolone (NASACORT AQ) 55 mcg nasal inhaler 2 Sprays daily.  Cholecalciferol, Vitamin D3, (VITAMIN D3) 1,000 unit cap Take 4,000 Units by mouth daily.        No Known Allergies  Past Medical History:   Diagnosis Date    Hypercholesterolemia     Panic attacks      Past Surgical History:   Procedure Laterality Date    ENDOSCOPY, COLON, DIAGNOSTIC  , 2014    Brand     Family History   Problem Relation Age of Onset    Cancer Mother         lung ( @ 67)   [facility] Obesity Mother     Hypertension Mother     High Cholesterol Mother     Elevated Lipids Mother     Cancer Father         prostate    Parkinsonism Father      Social History     Tobacco Use    Smoking status: Former Smoker     Packs/day: 20.00     Types: Cigarettes    Smokeless tobacco: Never Used   Substance Use Topics    Alcohol use: No     Alcohol/week: 0.0 - 1.0 standard drinks        Lab Results   Component Value Date/Time    Hemoglobin A1c 6.2 (H) 2019 10:38 AM    Hemoglobin A1c 6.1 (H) 10/03/2018 10:18 AM    Hemoglobin A1c 5.9 (H) 2018 09:04 AM    Glucose 97 2019 10:38 AM    LDL, calculated 130 (H) 04/04/2019 10:38 AM    Creatinine 0.83 04/04/2019 10:38 AM      Lab Results   Component Value Date/Time    Cholesterol, total 203 (H) 04/04/2019 10:38 AM    HDL Cholesterol 55 04/04/2019 10:38 AM    LDL, calculated 130 (H) 04/04/2019 10:38 AM    Triglyceride 88 04/04/2019 10:38 AM     Lab Results   Component Value Date/Time    ALT (SGPT) 21 04/04/2019 10:38 AM    AST (SGOT) 23 04/04/2019 10:38 AM    Alk. phosphatase 96 04/04/2019 10:38 AM    Bilirubin, direct 0.09 06/24/2015 10:03 AM    Bilirubin, total 0.4 04/04/2019 10:38 AM    Albumin 4.5 04/04/2019 10:38 AM    Protein, total 7.4 04/04/2019 10:38 AM    PLATELET 906 11/10/6010 10:18 AM     Lab Results   Component Value Date/Time    GFR est non-AA 74 04/04/2019 10:38 AM    GFR est AA 85 04/04/2019 10:38 AM    Creatinine 0.83 04/04/2019 10:38 AM    BUN 13 04/04/2019 10:38 AM    Sodium 142 04/04/2019 10:38 AM    Potassium 4.8 04/04/2019 10:38 AM    Chloride 100 04/04/2019 10:38 AM    CO2 27 04/04/2019 10:38 AM     Lab Results   Component Value Date/Time    TSH 1.250 10/03/2018 10:18 AM         Specific concerns today:   Elevated blood sugars - trying to watch the sugars and carbs in her diet and has lost some weight (9 lbs). Exercising some but could do more. Increased pain in left knee. Taking Tylenol which \"kind of\" helps. Taking 650mg 2 tabs bid. No recent ortho visit. Review of Systems  Constitutional: negative except for weight loss  Eyes: negative except for contacts/glasses and last eye exam approx 1 year ago  Ears, nose, mouth, throat, and face: negative  Respiratory: negative  Cardiovascular: negative  Gastrointestinal: negative  Genitourinary:negative  Integument/breast: negative  Hematologic/lymphatic: negative  Musculoskeletal:negative except for left knee pain as above  Neurological: negative except for mild tingling in hands when she writes a lot. Hx of CTS and would wear braces but not lately.     Behavioral/Psych: negative  Endocrine: negative  Allergic/Immunologic: negative    Objective:   Blood pressure 132/76, pulse 79, temperature 98.3 °F (36.8 °C), temperature source Oral, height 5' 4\" (1.626 m), weight 216 lb (98 kg), SpO2 96 %. Physical Examination:   General appearance - alert, well appearing, and in no distress and oriented to person, place, and time  Mental status - alert, oriented to person, place, and time  Eyes - pupils equal and reactive, extraocular eye movements intact  Ears - bilateral TM's and external ear canals normal  Nose - normal and patent, no erythema, discharge or polyps  Mouth - mucous membranes moist, pharynx normal without lesions  Neck - supple, no significant adenopathy, carotids upstroke normal bilaterally, no bruits, thyroid exam: thyroid is normal in size without nodules or tenderness  Lymphatics - no palpable lymphadenopathy, no hepatosplenomegaly  Chest - clear to auscultation, no wheezes, rales or rhonchi, symmetric air entry  Heart - normal rate, regular rhythm, normal S1, S2, no murmurs, rubs, clicks or gallops  Abdomen - soft, nontender, nondistended, no masses or organomegaly  bowel sounds normal  Breasts - breasts appear normal, no suspicious masses, no skin or nipple changes or axillary nodes  Back exam - full range of motion, no tenderness, palpable spasm or pain on motion  Neurological - alert, oriented, normal speech, no focal findings or movement disorder noted  Musculoskeletal - bilat crepitance knees. No swelling or tenderness  Extremities - peripheral pulses normal, no pedal edema, no clubbing or cyanosis  Skin - normal coloration and turgor, no rashes, no suspicious skin lesions noted     Assessment/Plan:   69yo female here for PE  call if any problems  Diagnoses and all orders for this visit:    1.  Routine general medical examination at a health care facility  -     METABOLIC PANEL, COMPREHENSIVE  -     LIPID PANEL  -     HEMOGLOBIN A1C WITH EAG  -     CBC W/O DIFF  -     TSH 3RD GENERATION  -     VITAMIN D, 25 HYDROXY    2. Encounter for immunization  -     PNEUMOCOCCAL POLYSACCHARIDE VACCINE, 23-VALENT, ADULT OR IMMUNOSUPPRESSED PT DOSE,  -     AR IMMUNIZ ADMIN,1 SINGLE/COMB VAC/TOXOID    3. Elevated blood sugar - continue with weight loss  -     HEMOGLOBIN A1C WITH EAG    4. Vitamin D deficiency - continue supplements, check labs  -     VITAMIN D, 25 HYDROXY    5. Severe obesity ([address] - discussed diet and exercise for futher weight loss    6. Hyperlipidemia- repeat labs with increased statin dose. Other orders  -     varicella-zoster recombinant, PF, (SHINGRIX, PF,) 50 mcg/0.5 mL susr injection; 0.5 mL by IntraMUSCular route once for 1 dose.  Repeat x 1 dose in 2-6 months

## 2019-10-08 NOTE — PROGRESS NOTES
Chief Complaint   Patient presents with    Physical     Visit Vitals  /88   Pulse 79   Temp 98.3 °F (36.8 °C) (Oral)   Ht 5' 4\" (1.626 m)   Wt 216 lb (98 kg)   SpO2 96%   BMI 37.08 kg/m²     1. Have you been to the ER, urgent care clinic since your last visit? Hospitalized since your last visit? no    2. Have you seen or consulted any other health care providers outside of the [address] [place] [address] since your last visit? Include any pap smears or colon screening.  no

## 2019-10-09 LAB
25(OH)D3+25(OH)D2 SERPL-MCNC: 58.9 NG/ML (ref 30–100)
ALBUMIN SERPL-MCNC: 4.4 G/DL (ref 3.6–4.8)
ALBUMIN/GLOB SERPL: 1.5 {RATIO} (ref 1.2–2.2)
ALP SERPL-CCNC: 98 IU/L (ref 39–117)
ALT SERPL-CCNC: 15 IU/L (ref 0–32)
AST SERPL-CCNC: 14 IU/L (ref 0–40)
BILIRUB SERPL-MCNC: 0.3 MG/DL (ref 0–1.2)
BUN SERPL-MCNC: 13 MG/DL (ref 8–27)
BUN/CREAT SERPL: 14 (ref 12–28)
CALCIUM SERPL-MCNC: 9.9 MG/DL (ref 8.7–10.3)
CHLORIDE SERPL-SCNC: 98 MMOL/L (ref 96–106)
CHOLEST SERPL-MCNC: 190 MG/DL (ref 100–199)
CO2 SERPL-SCNC: 27 MMOL/L (ref 20–29)
CREAT SERPL-MCNC: 0.91 MG/DL (ref 0.57–1)
ERYTHROCYTE [DISTWIDTH] IN BLOOD BY AUTOMATED COUNT: 14.1 % (ref 12.3–15.4)
EST. AVERAGE GLUCOSE BLD GHB EST-MCNC: 126 MG/DL
GLOBULIN SER CALC-MCNC: 2.9 G/DL (ref 1.5–4.5)
GLUCOSE SERPL-MCNC: 106 MG/DL (ref 65–99)
HBA1C MFR BLD: 6 % (ref 4.8–5.6)
HCT VFR BLD AUTO: 42.4 % (ref 34–46.6)
HDLC SERPL-MCNC: 50 MG/DL
HGB BLD-MCNC: 14.5 G/DL (ref 11.1–15.9)
INTERPRETATION, 910389: NORMAL
LDLC SERPL CALC-MCNC: 116 MG/DL (ref 0–99)
MCH RBC QN AUTO: 29.4 PG (ref 26.6–33)
MCHC RBC AUTO-ENTMCNC: 34.2 G/DL (ref 31.5–35.7)
MCV RBC AUTO: 86 FL (ref 79–97)
PLATELET # BLD AUTO: 317 X10E3/UL (ref 150–450)
POTASSIUM SERPL-SCNC: 4.7 MMOL/L (ref 3.5–5.2)
PROT SERPL-MCNC: 7.3 G/DL (ref 6–8.5)
RBC # BLD AUTO: 4.94 X10E6/UL (ref 3.77–5.28)
SODIUM SERPL-SCNC: 141 MMOL/L (ref 134–144)
TRIGL SERPL-MCNC: 121 MG/DL (ref 0–149)
TSH SERPL DL<=0.005 MIU/L-ACNC: 1.38 UIU/ML (ref 0.45–4.5)
VLDLC SERPL CALC-MCNC: 24 MG/DL (ref 5–40)
WBC # BLD AUTO: 7 X10E3/UL (ref 3.4–10.8)

## 2020-04-06 RX ORDER — DULOXETIN HYDROCHLORIDE 60 MG/1
CAPSULE, DELAYED RELEASE ORAL
Qty: 90 CAP | Refills: 3 | Status: SHIPPED | OUTPATIENT
Start: 2020-04-06 | End: 2021-04-27

## 2020-04-06 RX ORDER — SIMVASTATIN 40 MG/1
40 TABLET, FILM COATED ORAL
Qty: 90 TAB | Refills: 3 | Status: SHIPPED | OUTPATIENT
Start: 2020-04-06 | End: 2021-06-24

## 2020-04-06 NOTE — TELEPHONE ENCOUNTER
[employer]   Phone Number: [phone]             Caller (if not patient): Pt   Relationship of caller (if not patient): n/a   Best contact number(s): [phone]   Name of medication and dosage if known: Simvastatin 40 mg and Duloxetine 60 mg   Is patient out of this medication (yes/no): n   Pharmacy name: [facility]   Pharmacy listed in chart? (yes/no):  Y   Pharmacy phone number: n/a   Date of last visit: 10/08/19   Details to clarify the request: n/a

## 2021-01-14 ENCOUNTER — OFFICE VISIT (OUTPATIENT)
Dept: INTERNAL MEDICINE CLINIC | Age: 66
End: 2021-01-14
Payer: COMMERCIAL

## 2021-01-14 VITALS
TEMPERATURE: 98.4 F | WEIGHT: 223.4 LBS | HEART RATE: 85 BPM | SYSTOLIC BLOOD PRESSURE: 155 MMHG | DIASTOLIC BLOOD PRESSURE: 82 MMHG | RESPIRATION RATE: 18 BRPM | BODY MASS INDEX: 38.14 KG/M2 | HEIGHT: 64 IN

## 2021-01-14 DIAGNOSIS — Z00.00 ROUTINE GENERAL MEDICAL EXAMINATION AT A HEALTH CARE FACILITY: Primary | ICD-10-CM

## 2021-01-14 DIAGNOSIS — Z12.31 ENCOUNTER FOR SCREENING MAMMOGRAM FOR MALIGNANT NEOPLASM OF BREAST: ICD-10-CM

## 2021-01-14 DIAGNOSIS — E66.01 SEVERE OBESITY (HCC): ICD-10-CM

## 2021-01-14 DIAGNOSIS — R73.9 ELEVATED BLOOD SUGAR: ICD-10-CM

## 2021-01-14 DIAGNOSIS — E55.9 VITAMIN D DEFICIENCY: ICD-10-CM

## 2021-01-14 DIAGNOSIS — Z00.00 ROUTINE GENERAL MEDICAL EXAMINATION AT A HEALTH CARE FACILITY: ICD-10-CM

## 2021-01-14 PROCEDURE — 99397 PER PM REEVAL EST PAT 65+ YR: CPT | Performed by: INTERNAL MEDICINE

## 2021-01-14 NOTE — PROGRESS NOTES
Subjective:   77 y.o. female for Well Woman Check. Her gyne and breast care is done elsewhere by her Ob-Gyne physician. Patient Active Problem List    Diagnosis Date Noted    Severe obesity ([address] 10/03/2018    Elevated blood sugar 2017    Advance directive discussed with patient 2016    Hyperlipemia 2011    Panic attacks 2011     Current Outpatient Medications   Medication Sig Dispense Refill    simvastatin (ZOCOR) 40 mg tablet Take 1 Tab by mouth nightly. 90 Tab 3    DULoxetine (CYMBALTA) 60 mg capsule TAKE ONE CAPSULE BY MOUTH EVERY DAY 90 Cap 3    acetaminophen (TYLENOL ARTHRITIS PAIN) 650 mg TbER Take 650 mg by mouth two (2) times daily as needed.  Cetirizine (ZYRTEC) 10 mg cap Take 5 mg by mouth daily.  omeprazole (PRILOSEC) 20 mg capsule Take 20 mg by mouth daily. As needed      triamcinolone (NASACORT AQ) 55 mcg nasal inhaler 2 Sprays daily.  Cholecalciferol, Vitamin D3, (VITAMIN D3) 1,000 unit cap Take 4,000 Units by mouth daily.        No Known Allergies  Past Medical History:   Diagnosis Date    Hypercholesterolemia     Panic attacks      Past Surgical History:   Procedure Laterality Date    ENDOSCOPY, COLON, DIAGNOSTIC  , 2014    Brand     Family History   Problem Relation Age of Onset    Cancer Mother         lung ( @ 67)   Preeti Wilson Obesity Mother     Hypertension Mother     High Cholesterol Mother     Elevated Lipids Mother     Cancer Father         prostate    Parkinsonism Father      Social History     Tobacco Use    Smoking status: Former Smoker     Packs/day: 20.00     Years: 15.00     Pack years: 300.00     Types: Cigarettes     Quit date: 1987     Years since quittin.0    Smokeless tobacco: Never Used   Substance Use Topics    Alcohol use: No     Alcohol/week: 0.0 - 1.0 standard drinks        Lab Results   Component Value Date/Time    WBC 7.0 10/08/2019 11:27 AM    HGB 14.5 10/08/2019 11:27 AM    HCT 42.4 10/08/2019 11:27 AM    PLATELET 199 56/04/8167 11:27 AM    MCV 86 10/08/2019 11:27 AM     Lab Results   Component Value Date/Time    Hemoglobin A1c 6.0 (H) 10/08/2019 11:27 AM    Hemoglobin A1c 6.2 (H) 04/04/2019 10:38 AM    Hemoglobin A1c 6.1 (H) 10/03/2018 10:18 AM    Glucose 106 (H) 10/08/2019 11:27 AM    LDL, calculated 116 (H) 10/08/2019 11:27 AM    Creatinine 0.91 10/08/2019 11:27 AM      Lab Results   Component Value Date/Time    Cholesterol, total 190 10/08/2019 11:27 AM    HDL Cholesterol 50 10/08/2019 11:27 AM    LDL, calculated 116 (H) 10/08/2019 11:27 AM    Triglyceride 121 10/08/2019 11:27 AM     Lab Results   Component Value Date/Time    ALT (SGPT) 15 10/08/2019 11:27 AM    Alk. phosphatase 98 10/08/2019 11:27 AM    Bilirubin, direct 0.09 06/24/2015 10:03 AM    Bilirubin, total 0.3 10/08/2019 11:27 AM    Albumin 4.4 10/08/2019 11:27 AM    Protein, total 7.3 10/08/2019 11:27 AM    PLATELET 629 86/95/5785 11:27 AM     Lab Results   Component Value Date/Time    GFR est non-AA 66 10/08/2019 11:27 AM    GFR est AA 76 10/08/2019 11:27 AM    Creatinine 0.91 10/08/2019 11:27 AM    BUN 13 10/08/2019 11:27 AM    Sodium 141 10/08/2019 11:27 AM    Potassium 4.7 10/08/2019 11:27 AM    Chloride 98 10/08/2019 11:27 AM    CO2 27 10/08/2019 11:27 AM     Lab Results   Component Value Date/Time    TSH 1.380 10/08/2019 11:27 AM         Specific concerns today:   Lump vs swelling posterior left knee - there for months. Intermittent - today less swollen. Can be tender and warm  Continued left knee pain due to OA. Taking tylenol and using CBD cream.  Cannot walk secondary to knee pain. .    Review of Systems  Constitutional: negative except for weight gain of 7 lbs in the last year.     Eyes: negative  Ears, nose, mouth, throat, and face: negative  Respiratory: negative  Cardiovascular: negative  Gastrointestinal: negative  Genitourinary:negative  Integument/breast: negative  Hematologic/lymphatic: negative  Musculoskeletal:negative except for left knee pain as above. finger stiffness. Neurological: negative  Behavioral/Psych: negative  Endocrine: negative  Allergic/Immunologic: negative    Objective:   Blood pressure (!) 155/82, pulse 85, temperature 98.4 °F (36.9 °C), temperature source Temporal, resp. rate 18, height 5' 4\" (1.626 m), weight 223 lb 6.4 oz (101.3 kg). Physical Examination:   General appearance - alert, well appearing, and in no distress and oriented to person, place, and time  Mental status - alert, oriented to person, place, and time  Eyes - pupils equal and reactive, extraocular eye movements intact  Ears - bilateral TM's and external ear canals normal  Nose - normal and patent, no erythema, discharge or polyps  Mouth - mucous membranes moist, pharynx normal without lesions  Neck - supple, no significant adenopathy, carotids upstroke normal bilaterally, no bruits, thyroid exam: thyroid is normal in size without nodules or tenderness  Lymphatics - no palpable lymphadenopathy, no hepatosplenomegaly  Chest - clear to auscultation, no wheezes, rales or rhonchi, symmetric air entry  Heart - normal rate, regular rhythm, normal S1, S2, no murmurs, rubs, clicks or gallops  Abdomen - soft, nontender, nondistended, no masses or organomegaly  bowel sounds normal  Breasts - breasts appear normal, no suspicious masses, no skin or nipple changes or axillary nodes  Back exam - full range of motion, no tenderness, palpable spasm or pain on motion  Neurological - alert, oriented, normal speech, no focal findings or movement disorder noted  Musculoskeletal - left knee with mild surrounding swelling. Decreased rom, crepitance  Extremities - peripheral pulses normal, no pedal edema, no clubbing or cyanosis  Skin - normal coloration and turgor, no rashes, no suspicious skin lesions noted     Assessment/Plan:   76 yo female  Hyperlipidemia - check labs, cotn same  Elevated bs - repeat labs.     Panic attacks - continue cymbalta  Severe obesity - weight loss through diet and exercise  Left knee oa - discussed ortho referral for injections. Trial glucosamine  Elevated BS - repeat A1C. Weight loss. HM - check labs, mammogram.    call if any problems  Orders Placed This Encounter    ADELE MAMMO BI SCREENING INCL CAD    METABOLIC PANEL, COMPREHENSIVE    LIPID PANEL    CBC W/O DIFF    TSH 3RD GENERATION    VITAMIN D, 25 HYDROXY    HEMOGLOBIN A1C WITH EAG     Follow-up and Dispositions    · Return in about 4 weeks (around 2/11/2021) for elevated blood pressure.

## 2021-01-14 NOTE — PROGRESS NOTES
Chief Complaint   Patient presents with    Complete Physical    Medication Refill     90 day refills needed           1. Have you been to the ER, urgent care clinic since your last visit? Hospitalized since your last visit? No    2. Have you seen or consulted any other health care providers outside of the [address] [place] [address] since your last visit? Include any pap smears or colon screening.  No

## 2021-01-22 LAB
25(OH)D3+25(OH)D2 SERPL-MCNC: 55.7 NG/ML (ref 30–100)
ALBUMIN SERPL-MCNC: 4.6 G/DL (ref 3.8–4.8)
ALBUMIN/GLOB SERPL: 1.7 {RATIO} (ref 1.2–2.2)
ALP SERPL-CCNC: 107 IU/L (ref 39–117)
ALT SERPL-CCNC: 14 IU/L (ref 0–32)
AST SERPL-CCNC: 18 IU/L (ref 0–40)
BILIRUB SERPL-MCNC: 0.4 MG/DL (ref 0–1.2)
BUN SERPL-MCNC: 14 MG/DL (ref 8–27)
BUN/CREAT SERPL: 14 (ref 12–28)
CALCIUM SERPL-MCNC: 10 MG/DL (ref 8.7–10.3)
CHLORIDE SERPL-SCNC: 101 MMOL/L (ref 96–106)
CHOLEST SERPL-MCNC: 192 MG/DL (ref 100–199)
CO2 SERPL-SCNC: 26 MMOL/L (ref 20–29)
CREAT SERPL-MCNC: 0.97 MG/DL (ref 0.57–1)
ERYTHROCYTE [DISTWIDTH] IN BLOOD BY AUTOMATED COUNT: 13.9 % (ref 11.7–15.4)
EST. AVERAGE GLUCOSE BLD GHB EST-MCNC: 126 MG/DL
GLOBULIN SER CALC-MCNC: 2.7 G/DL (ref 1.5–4.5)
GLUCOSE SERPL-MCNC: 116 MG/DL (ref 65–99)
HBA1C MFR BLD: 6 % (ref 4.8–5.6)
HCT VFR BLD AUTO: 46.4 % (ref 34–46.6)
HDLC SERPL-MCNC: 58 MG/DL
HGB BLD-MCNC: 15.1 G/DL (ref 11.1–15.9)
INTERPRETATION, 910389: NORMAL
LDLC SERPL CALC-MCNC: 116 MG/DL (ref 0–99)
MCH RBC QN AUTO: 28.5 PG (ref 26.6–33)
MCHC RBC AUTO-ENTMCNC: 32.5 G/DL (ref 31.5–35.7)
MCV RBC AUTO: 88 FL (ref 79–97)
PLATELET # BLD AUTO: 341 X10E3/UL (ref 150–450)
POTASSIUM SERPL-SCNC: 5.4 MMOL/L (ref 3.5–5.2)
PROT SERPL-MCNC: 7.3 G/DL (ref 6–8.5)
RBC # BLD AUTO: 5.29 X10E6/UL (ref 3.77–5.28)
SODIUM SERPL-SCNC: 142 MMOL/L (ref 134–144)
TRIGL SERPL-MCNC: 101 MG/DL (ref 0–149)
TSH SERPL DL<=0.005 MIU/L-ACNC: 1.67 UIU/ML (ref 0.45–4.5)
VLDLC SERPL CALC-MCNC: 18 MG/DL (ref 5–40)
WBC # BLD AUTO: 7.4 X10E3/UL (ref 3.4–10.8)

## 2021-02-15 NOTE — PROGRESS NOTES
HISTORY OF PRESENT ILLNESS  Jeimy Horan is a 77 y.o. female. HPI  Here for follow up of elevated blood pressure at visit 1/14/2021. B was 155/82. Instructed to work on diet and exercise for improvement in BP. Feels good. Denies chest pain, tightness, sob, headaches, dizziness or le edema. Both parents with HTN. Current Outpatient Medications:     simvastatin (ZOCOR) 40 mg tablet, Take 1 Tab by mouth nightly., Disp: 90 Tab, Rfl: 3    DULoxetine (CYMBALTA) 60 mg capsule, TAKE ONE CAPSULE BY MOUTH EVERY DAY, Disp: 90 Cap, Rfl: 3    acetaminophen (TYLENOL ARTHRITIS PAIN) 650 mg TbER, Take 650 mg by mouth two (2) times daily as needed. , Disp: , Rfl:     Cetirizine (ZYRTEC) 10 mg cap, Take 5 mg by mouth daily. , Disp: , Rfl:     omeprazole (PRILOSEC) 20 mg capsule, Take 20 mg by mouth daily. As needed, Disp: , Rfl:     triamcinolone (NASACORT AQ) 55 mcg nasal inhaler, 2 Sprays daily. , Disp: , Rfl:     Cholecalciferol, Vitamin D3, (VITAMIN D3) 1,000 unit cap, Take 4,000 Units by mouth daily. , Disp: , Rfl:     Visit Vitals  BP (!) 197/97 (BP 1 Location: Left upper arm, BP Patient Position: Sitting, BP Cuff Size: Large adult)   Pulse 86   Temp 97 °F (36.1 °C) (Temporal)   Resp 18   Ht 5' 4\" (1.626 m)   Wt 223 lb 3.2 oz (101.2 kg)   BMI 38.31 kg/m²       ROS see above  Physical Exam  Vitals signs reviewed. Constitutional:       Appearance: Normal appearance. HENT:      Head: Normocephalic and atraumatic. Neck:      Musculoskeletal: Neck supple. Cardiovascular:      Rate and Rhythm: Normal rate and regular rhythm. Pulses: Normal pulses. Heart sounds: Normal heart sounds. Pulmonary:      Effort: Pulmonary effort is normal.      Breath sounds: Normal breath sounds. Musculoskeletal:      Right lower leg: No edema. Left lower leg: No edema. Lymphadenopathy:      Cervical: No cervical adenopathy. Neurological:      Mental Status: She is alert.          ASSESSMENT and PLAN  HTN - new diagnosis. Start HCTZ. Continue to work on salt intake and exercise. Orders Placed This Encounter    hydroCHLOROthiazide (HYDRODIURIL) 25 mg tablet     Follow-up and Dispositions    · Return in about 4 weeks (around 3/16/2021) for htn.

## 2021-02-16 ENCOUNTER — OFFICE VISIT (OUTPATIENT)
Dept: INTERNAL MEDICINE CLINIC | Age: 66
End: 2021-02-16
Payer: COMMERCIAL

## 2021-02-16 VITALS
SYSTOLIC BLOOD PRESSURE: 160 MMHG | RESPIRATION RATE: 18 BRPM | WEIGHT: 223.2 LBS | DIASTOLIC BLOOD PRESSURE: 92 MMHG | BODY MASS INDEX: 38.1 KG/M2 | HEIGHT: 64 IN | HEART RATE: 86 BPM | TEMPERATURE: 97 F

## 2021-02-16 DIAGNOSIS — I10 ESSENTIAL HYPERTENSION: Primary | ICD-10-CM

## 2021-02-16 PROCEDURE — 99213 OFFICE O/P EST LOW 20 MIN: CPT | Performed by: INTERNAL MEDICINE

## 2021-02-16 RX ORDER — HYDROCHLOROTHIAZIDE 25 MG/1
25 TABLET ORAL DAILY
Qty: 90 TAB | Refills: 3 | Status: SHIPPED | OUTPATIENT
Start: 2021-02-16 | End: 2022-02-02

## 2021-02-16 NOTE — PROGRESS NOTES
Chief Complaint   Patient presents with    Blood Pressure Check     follow up     Cholesterol Problem    Diabetes    Obesity         1. Have you been to the ER, urgent care clinic since your last visit? Hospitalized since your last visit? No    2. Have you seen or consulted any other health care providers outside of the [address] [place] [address] since your last visit? Include any pap smears or colon screening.  No

## 2021-03-16 NOTE — PROGRESS NOTES
Subjective:     Marlene Daugherty is a 74 y.o. female who presents for follow up of hypertension. Last visit started on HCTZ    Diet and Lifestyle: has cut back on her salt. No exercise. Home BP Monitoring: is not measured at home    Cardiovascular ROS: taking medications as instructed, no medication side effects noted, no TIA's, no chest pain on exertion, no dyspnea on exertion, no swelling of ankles, no orthostatic dizziness or lightheadedness, no palpitations. New concerns:   Left knee pain - anterior in joint. Worse when stands after sitting and initially getting out of bed. Painful walking up stairs or up steps. Mild intermittent swelling. Heat helps. Occ Tylenol Arthritis x 2 helps as well. . Saw Dr. Anette Manzanares in the past.  Intermittent steroid injections. Completed PT. Pain used to be intermittent but now occurring more regularly. Current Outpatient Medications   Medication Sig Dispense Refill    hydroCHLOROthiazide (HYDRODIURIL) 25 mg tablet Take 1 Tab by mouth daily. 90 Tab 3    simvastatin (ZOCOR) 40 mg tablet Take 1 Tab by mouth nightly. 90 Tab 3    DULoxetine (CYMBALTA) 60 mg capsule TAKE ONE CAPSULE BY MOUTH EVERY DAY 90 Cap 3    acetaminophen (TYLENOL ARTHRITIS PAIN) 650 mg TbER Take 650 mg by mouth two (2) times daily as needed.  Cetirizine (ZYRTEC) 10 mg cap Take 5 mg by mouth daily.  omeprazole (PRILOSEC) 20 mg capsule Take 20 mg by mouth daily. As needed      triamcinolone (NASACORT AQ) 55 mcg nasal inhaler 2 Sprays daily.  Cholecalciferol, Vitamin D3, (VITAMIN D3) 1,000 unit cap Take 4,000 Units by mouth daily.           Lab Results   Component Value Date/Time    GFR est non-AA 61 01/21/2021 10:11 AM    GFR est AA 70 01/21/2021 10:11 AM    Creatinine 0.97 01/21/2021 10:11 AM    BUN 14 01/21/2021 10:11 AM    Sodium 142 01/21/2021 10:11 AM    Potassium 5.4 (H) 01/21/2021 10:11 AM    Chloride 101 01/21/2021 10:11 AM    CO2 26 01/21/2021 10:11 AM        Review of Systems, additional:  Pertinent items are noted in HPI. Objective:     Visit Vitals  /78   Pulse 85   Temp 97.7 °F (36.5 °C) (Temporal)   Resp 18   Ht 5' 4\" (1.626 m)   Wt 218 lb 6.4 oz (99.1 kg)   BMI 37.49 kg/m²     Appearance: alert, well appearing, and in no distress and oriented to person, place, and time. General exam:   NECK: supple, no lad, no bruit, no tm  LUNGS: cta bilat  CV rrr, no m/g/r  EXT: no c/c/e    Assessment/Plan:     hypertension well controlled. current treatment plan is effective, no change in therapy. Orders Placed This Encounter    [facility]     Follow-up and Dispositions    · Return in about 4 months (around 7/17/2021) for htn, hyperlipidemia.

## 2021-03-17 ENCOUNTER — OFFICE VISIT (OUTPATIENT)
Dept: INTERNAL MEDICINE CLINIC | Age: 66
End: 2021-03-17
Payer: COMMERCIAL

## 2021-03-17 VITALS
RESPIRATION RATE: 18 BRPM | HEIGHT: 64 IN | TEMPERATURE: 97.7 F | DIASTOLIC BLOOD PRESSURE: 78 MMHG | BODY MASS INDEX: 37.28 KG/M2 | SYSTOLIC BLOOD PRESSURE: 132 MMHG | HEART RATE: 85 BPM | WEIGHT: 218.4 LBS

## 2021-03-17 DIAGNOSIS — I10 ESSENTIAL HYPERTENSION: Primary | ICD-10-CM

## 2021-03-17 DIAGNOSIS — M25.562 ACUTE PAIN OF LEFT KNEE: ICD-10-CM

## 2021-03-17 PROCEDURE — 99213 OFFICE O/P EST LOW 20 MIN: CPT | Performed by: INTERNAL MEDICINE

## 2021-03-17 NOTE — PROGRESS NOTES
Chief Complaint   Patient presents with    Cholesterol Problem    Obesity         1. Have you been to the ER, urgent care clinic since your last visit? Hospitalized since your last visit? No    2. Have you seen or consulted any other health care providers outside of the [address] [place] [address] since your last visit? Include any pap smears or colon screening.  No

## 2021-04-08 NOTE — MR AVS SNAPSHOT
Visit Information Date & Time Provider Department Dept. Phone Encounter #  
 1/19/2017 11:20 AM Juan Antonio Mack MD [facility] [phone] [id number] Follow-up Instructions Return in about 6 months (around 7/19/2017) for hyperlipidemia, elevated bs. Upcoming Health Maintenance Date Due Hepatitis C Screening 1955 PAP AKA CERVICAL CYTOLOGY 8/12/2016 BREAST CANCER SCRN MAMMOGRAM 12/2/2016 DTaP/Tdap/Td series (2 - Td) 11/21/2021 COLONOSCOPY 9/29/2024 Allergies as of 1/19/2017  Review Complete On: 1/19/2017 By: Juan Antonio Mack MD  
 No Known Allergies Current Immunizations  Reviewed on 12/12/2016 Name Date Influenza Vaccine 12/8/2016, 12/1/2015, 11/19/2014, 10/9/2013 Influenza Vaccine Whole 10/5/2011, 11/2/2010 TDAP Vaccine 11/21/2011 12:11 PM  
 Zoster Vaccine, Live 6/9/2013 Not reviewed this visit You Were Diagnosed With   
  
 Codes Comments Pure hypercholesterolemia    -  Primary ICD-10-CM: E78.00 ICD-9-CM: 272.0 Elevated blood sugar     ICD-10-CM: R73.9 ICD-9-CM: 790.29 Need for hepatitis C screening test     ICD-10-CM: Z11.59 
ICD-9-CM: V73.89 Neck pain on right side     ICD-10-CM: M54.2 ICD-9-CM: 723.1 Vitals BP Pulse Temp Resp Height(growth percentile) Weight(growth percentile) 158/89 (BP 1 Location: Left arm, BP Patient Position: Sitting) 85 98 °F (36.7 °C) (Oral) 17 5' 4\" (1.626 m) 221 lb 9.6 oz (100.5 kg) SpO2 BMI OB Status Smoking Status 98% 38.04 kg/m2 Postmenopausal Former Smoker BMI and BSA Data Body Mass Index Body Surface Area 38.04 kg/m 2 2.13 m 2 Preferred Pharmacy Pharmacy Name Phone [id number] [place], [address] [phone] Your Updated Medication List  
  
   
This list is accurate as of: 1/19/17 11:58 AM.  Always use your most recent med list.  
  
  
  
 ALEVE 220 mg tablet Generic drug:  naproxen sodium Take 220 mg by mouth two (2) times daily (with meals). ALLEGRA 180 mg tablet Generic drug:  fexofenadine Take  by mouth daily. aspirin 81 mg tablet Take 81 mg by mouth. DULoxetine 60 mg capsule Commonly known as:  CYMBALTA Take 1 Cap by mouth daily. methylPREDNISolone 4 mg tablet Commonly known as:  MEDROL (AMBER) Take as directed NASACORT AQ 55 mcg nasal inhaler Generic drug:  triamcinolone 2 Sprays daily. simvastatin 20 mg tablet Commonly known as:  ZOCOR  
TAKE 1 TABLET BY MOUTH EVERY NIGHT  
  
 VITAMIN D3 1,000 unit Cap Generic drug:  cholecalciferol Take 4,000 Units by mouth daily. Prescriptions Sent to Pharmacy Refills  
 methylPREDNISolone (MEDROL, AMBER,) 4 mg tablet 0 Sig: Take as directed Class: Normal  
 Pharmacy: [id number], [address] [place] [address] Ph #: [phone] We Performed the Following HEMOGLOBIN A1C WITH EAG [14490 CPT(R)] HEPATITIS C AB [11894 CPT(R)] LIPID PANEL [81947 CPT(R)] METABOLIC PANEL, COMPREHENSIVE [91185 CPT(R)] Follow-up Instructions Return in about 6 months (around 7/19/2017) for hyperlipidemia, elevated bs. [facility]! Dear Sidra Hogue: 
Thank you for requesting a webtide account. Our records indicate that you already have an active webtide account. You can access your account anytime at [email or web address] Did you know that you can access your hospital and ER discharge instructions at any time in webtide? You can also review all of your test results from your hospital stay or ER visit. Additional Information If you have questions, please visit the Frequently Asked Questions section of the webtide website at [email or web address] Remember, [employer] is NOT to be used for urgent needs. For medical emergencies, dial 911. Now available from your iPhone and Android! Please provide this summary of care documentation to your next provider. Your primary care clinician is listed as RODOLFO OLVERA. If you have any questions after today's visit, please call [phone]. General

## 2021-04-27 RX ORDER — DULOXETIN HYDROCHLORIDE 60 MG/1
CAPSULE, DELAYED RELEASE ORAL
Qty: 90 CAP | Refills: 3 | Status: SHIPPED | OUTPATIENT
Start: 2021-04-27 | End: 2022-04-21

## 2021-06-24 RX ORDER — SIMVASTATIN 40 MG/1
TABLET, FILM COATED ORAL
Qty: 90 TABLET | Refills: 3 | Status: SHIPPED | OUTPATIENT
Start: 2021-06-24 | End: 2022-06-16

## 2021-08-19 ENCOUNTER — OFFICE VISIT (OUTPATIENT)
Dept: INTERNAL MEDICINE CLINIC | Age: 66
End: 2021-08-19
Payer: COMMERCIAL

## 2021-08-19 VITALS
TEMPERATURE: 98.4 F | DIASTOLIC BLOOD PRESSURE: 72 MMHG | OXYGEN SATURATION: 99 % | HEIGHT: 64 IN | BODY MASS INDEX: 35.85 KG/M2 | RESPIRATION RATE: 12 BRPM | HEART RATE: 89 BPM | SYSTOLIC BLOOD PRESSURE: 135 MMHG | WEIGHT: 210 LBS

## 2021-08-19 DIAGNOSIS — R73.9 ELEVATED BLOOD SUGAR: ICD-10-CM

## 2021-08-19 DIAGNOSIS — F41.0 PANIC ATTACKS: ICD-10-CM

## 2021-08-19 DIAGNOSIS — Z12.31 ENCOUNTER FOR SCREENING MAMMOGRAM FOR MALIGNANT NEOPLASM OF BREAST: ICD-10-CM

## 2021-08-19 DIAGNOSIS — E55.9 VITAMIN D DEFICIENCY: ICD-10-CM

## 2021-08-19 DIAGNOSIS — E78.00 PURE HYPERCHOLESTEROLEMIA: ICD-10-CM

## 2021-08-19 DIAGNOSIS — E66.01 SEVERE OBESITY (HCC): ICD-10-CM

## 2021-08-19 DIAGNOSIS — I10 ESSENTIAL HYPERTENSION: Primary | ICD-10-CM

## 2021-08-19 DIAGNOSIS — Z20.822 EXPOSURE TO COVID-19 VIRUS: ICD-10-CM

## 2021-08-19 PROCEDURE — 99214 OFFICE O/P EST MOD 30 MIN: CPT | Performed by: INTERNAL MEDICINE

## 2021-08-19 NOTE — PROGRESS NOTES
Subjective:     Violet Pisano is a 74 y.o. female who presents for follow up of hypertension, hyperlipidemia and elevated blood sugar. Diet and Lifestyle: generally follows a low sodium diet, sedentary  Home BP Monitoring: is not measured at home    Cardiovascular ROS: taking medications as instructed, no medication side effects noted, no TIA's, no chest pain on exertion, no dyspnea on exertion, no swelling of ankles, no orthostatic dizziness or lightheadedness, no palpitations. New concerns:   Due for mammogram.  . Anxiety/panic doing ok with Cymbalta. Seems to be worse during February. Current Outpatient Medications   Medication Sig Dispense Refill    simvastatin (ZOCOR) 40 mg tablet TAKE 1 TABLET BY MOUTH EVERY DAY AT NIGHT 90 Tablet 3    DULoxetine (CYMBALTA) 60 mg capsule TAKE 1 CAPSULE BY MOUTH EVERY DAY 90 Cap 3    hydroCHLOROthiazide (HYDRODIURIL) 25 mg tablet Take 1 Tab by mouth daily. 90 Tab 3    acetaminophen (TYLENOL ARTHRITIS PAIN) 650 mg TbER Take 650 mg by mouth two (2) times daily as needed.  Cetirizine (ZYRTEC) 10 mg cap Take 5 mg by mouth daily.  omeprazole (PRILOSEC) 20 mg capsule Take 20 mg by mouth daily. As needed      triamcinolone (NASACORT AQ) 55 mcg nasal inhaler 2 Sprays daily.  Cholecalciferol, Vitamin D3, (VITAMIN D3) 1,000 unit cap Take 5,000 Units by mouth daily.           Lab Results   Component Value Date/Time    Hemoglobin A1c 6.0 (H) 01/21/2021 10:11 AM    Hemoglobin A1c 6.0 (H) 10/08/2019 11:27 AM    Hemoglobin A1c 6.2 (H) 04/04/2019 10:38 AM    Glucose 116 (H) 01/21/2021 10:11 AM    LDL, calculated 116 (H) 01/21/2021 10:11 AM    LDL, calculated 116 (H) 10/08/2019 11:27 AM    Creatinine 0.97 01/21/2021 10:11 AM      Lab Results   Component Value Date/Time    Cholesterol, total 192 01/21/2021 10:11 AM    HDL Cholesterol 58 01/21/2021 10:11 AM    LDL, calculated 116 (H) 01/21/2021 10:11 AM    LDL, calculated 116 (H) 10/08/2019 11:27 AM Triglyceride 101 01/21/2021 10:11 AM     Lab Results   Component Value Date/Time    ALT (SGPT) 14 01/21/2021 10:11 AM    Alk. phosphatase 107 01/21/2021 10:11 AM    Bilirubin, direct 0.09 06/24/2015 10:03 AM    Bilirubin, total 0.4 01/21/2021 10:11 AM    Albumin 4.6 01/21/2021 10:11 AM    Protein, total 7.3 01/21/2021 10:11 AM    PLATELET 851 84/73/4586 10:11 AM     Lab Results   Component Value Date/Time    GFR est non-AA 61 01/21/2021 10:11 AM    GFR est AA 70 01/21/2021 10:11 AM    Creatinine 0.97 01/21/2021 10:11 AM    BUN 14 01/21/2021 10:11 AM    Sodium 142 01/21/2021 10:11 AM    Potassium 5.4 (H) 01/21/2021 10:11 AM    Chloride 101 01/21/2021 10:11 AM    CO2 26 01/21/2021 10:11 AM        Review of Systems, additional:  Pertinent items are noted in HPI. Objective:     Visit Vitals  /72   Pulse 89   Temp 98.4 °F (36.9 °C) (Temporal)   Resp 12   Ht 5' 4\" (1.626 m)   Wt 210 lb (95.3 kg)   SpO2 99%   BMI 36.05 kg/m²     Appearance: alert, well appearing, and in no distress and oriented to person, place, and time. General exam:   NECK: supple, no lad, no bruit, no tm  LUNGS: cta bilat  CV rrr, no m/g/r  ABD: soft, nt, nd, nabs  EXT: no c/c/e. Assessment/Plan:     hypertension well controlled. current treatment plan is effective, no change in therapy. HLD - controlled, cont same  Check labs    Elevated blood sugar - controlled  Continue to work on diet and exercise  Check labs    Vit D def - continue supplements  Repeat labs    Panic attacks - controlled, cont same    Severe obesity - discussed diet and exercise for weight loss. HM - mammogram.  Requests COVID antibodies.    Orders Placed This Encounter    ADELE MAMMO BI SCREENING INCL CAD    METABOLIC PANEL, COMPREHENSIVE    LIPID PANEL    HEMOGLOBIN A1C WITH EAG    VITAMIN D, 25 HYDROXY    SARS-COV-2 AB, IGG ([facility] Default)     Follow-up and Dispositions    · Return in about 6 months (around 2/19/2022) for complete PE.

## 2021-08-19 NOTE — PROGRESS NOTES
Ely Salgado is a 74 y.o. female    Chief Complaint   Patient presents with    Follow-up    Hypertension       Health Maintenance Due   Topic Date Due    Breast Cancer Screen Mammogram  12/03/2020    COVID-19 Vaccine (2 - Moderna 2-dose series) 02/19/2021       Visit Vitals  BP (!) 157/73 (BP 1 Location: Left upper arm, BP Patient Position: Sitting)   Pulse 89   Temp 98.4 °F (36.9 °C) (Temporal)   Resp 12   Ht 5' 4\" (1.626 m)   Wt 210 lb (95.3 kg)   SpO2 99%   BMI 36.05 kg/m²       3 most recent PHQ Screens 8/19/2021   Little interest or pleasure in doing things Not at all   Feeling down, depressed, irritable, or hopeless Not at all   Total Score PHQ 2 0       Fall Risk Assessment, last 12 mths 8/19/2021   Able to walk? Yes   Fall in past 12 months? 0   Do you feel unsteady? 0   Are you worried about falling 0       Abuse Screening Questionnaire 3/17/2021   Do you ever feel afraid of your partner? N   Are you in a relationship with someone who physically or mentally threatens you? N   Is it safe for you to go home? Y         1. Have you been to the ER, urgent care clinic since your last visit? Hospitalized since your last visit?no    2. Have you seen or consulted any other health care providers outside of the [address] [place] [address] since your last visit? Include any pap smears or colon screening. yes OBGYN and Ortho

## 2021-08-24 LAB
25(OH)D3+25(OH)D2 SERPL-MCNC: 59.3 NG/ML (ref 30–100)
ALBUMIN SERPL-MCNC: 4.2 G/DL (ref 3.8–4.8)
ALBUMIN/GLOB SERPL: 1.4 {RATIO} (ref 1.2–2.2)
ALP SERPL-CCNC: 98 IU/L (ref 48–121)
ALT SERPL-CCNC: 15 IU/L (ref 0–32)
AST SERPL-CCNC: 21 IU/L (ref 0–40)
BILIRUB SERPL-MCNC: 0.3 MG/DL (ref 0–1.2)
BUN SERPL-MCNC: 16 MG/DL (ref 8–27)
BUN/CREAT SERPL: 16 (ref 12–28)
CALCIUM SERPL-MCNC: 10 MG/DL (ref 8.7–10.3)
CHLORIDE SERPL-SCNC: 97 MMOL/L (ref 96–106)
CHOLEST SERPL-MCNC: 193 MG/DL (ref 100–199)
CO2 SERPL-SCNC: 28 MMOL/L (ref 20–29)
CREAT SERPL-MCNC: 1.03 MG/DL (ref 0.57–1)
DIASORIN SARS-COV-2 AB, IGG, RCVG3T: POSITIVE
EST. AVERAGE GLUCOSE BLD GHB EST-MCNC: 128 MG/DL
GLOBULIN SER CALC-MCNC: 3 G/DL (ref 1.5–4.5)
GLUCOSE SERPL-MCNC: 114 MG/DL (ref 65–99)
HBA1C MFR BLD: 6.1 % (ref 4.8–5.6)
HDLC SERPL-MCNC: 49 MG/DL
IMP & REVIEW OF LAB RESULTS: NORMAL
INTERPRETATION: NORMAL
LDLC SERPL CALC-MCNC: 120 MG/DL (ref 0–99)
POTASSIUM SERPL-SCNC: 4.9 MMOL/L (ref 3.5–5.2)
PROT SERPL-MCNC: 7.2 G/DL (ref 6–8.5)
SODIUM SERPL-SCNC: 140 MMOL/L (ref 134–144)
TRIGL SERPL-MCNC: 133 MG/DL (ref 0–149)
VLDLC SERPL CALC-MCNC: 24 MG/DL (ref 5–40)

## 2022-02-02 RX ORDER — HYDROCHLOROTHIAZIDE 25 MG/1
TABLET ORAL
Qty: 90 TABLET | Refills: 3 | Status: SHIPPED | OUTPATIENT
Start: 2022-02-02

## 2022-02-07 ENCOUNTER — VIRTUAL VISIT (OUTPATIENT)
Dept: INTERNAL MEDICINE CLINIC | Age: 67
End: 2022-02-07
Payer: COMMERCIAL

## 2022-02-07 DIAGNOSIS — J01.00 ACUTE NON-RECURRENT MAXILLARY SINUSITIS: Primary | ICD-10-CM

## 2022-02-07 PROCEDURE — 99213 OFFICE O/P EST LOW 20 MIN: CPT | Performed by: PHYSICIAN ASSISTANT

## 2022-02-07 RX ORDER — CEFDINIR 300 MG/1
300 CAPSULE ORAL 2 TIMES DAILY
Qty: 20 CAPSULE | Refills: 0 | Status: SHIPPED | OUTPATIENT
Start: 2022-02-07 | End: 2022-02-21 | Stop reason: ALTCHOICE

## 2022-02-07 NOTE — PROGRESS NOTES
Jannet Fulton is a 74 y.o. female who was seen by synchronous (real-time) audio-video technology on 2/7/2022 for Cold Symptoms        Assessment & Plan:   Diagnoses and all orders for this visit:    1. Acute non-recurrent maxillary sinusitis  -     cefdinir (OMNICEF) 300 mg capsule; Take 1 Capsule by mouth two (2) times a day. Patient should follow up if symptoms persist or worsen  I spent at least 20 minutes on this visit with this established patient. 712  Subjective:   Patient presents today for evaluation of sinus symptoms. The patient reports about 8 days ago she started with a sore throat and runny nose. She also developed a small cough. She states since that time she has used about 5- 6 boxes of the tissues. She also reports having facial pain sinus pain now. The patient states since her symptoms started she has taken 2 different Covid test both of which was negative. Prior to Admission medications    Medication Sig Start Date End Date Taking? Authorizing Provider   cefdinir (OMNICEF) 300 mg capsule Take 1 Capsule by mouth two (2) times a day. 2/7/22  Yes Xuan Sher PA-C   hydroCHLOROthiazide (HYDRODIURIL) 25 mg tablet TAKE 1 TABLET BY MOUTH EVERY DAY 2/2/22  Yes Rufina Oscar MD   simvastatin (ZOCOR) 40 mg tablet TAKE 1 TABLET BY MOUTH EVERY DAY AT NIGHT 6/24/21  Yes Rufina Oscar MD   DULoxetine (CYMBALTA) 60 mg capsule TAKE 1 CAPSULE BY MOUTH EVERY DAY 4/27/21  Yes Rufina Oscar MD   acetaminophen (TYLENOL ARTHRITIS PAIN) 650 mg TbER Take 650 mg by mouth two (2) times daily as needed. Yes Provider, Historical   Cetirizine (ZYRTEC) 10 mg cap Take 5 mg by mouth daily. Yes Provider, Historical   omeprazole (PRILOSEC) 20 mg capsule Take 20 mg by mouth daily. As needed   Yes Provider, Historical   triamcinolone (NASACORT AQ) 55 mcg nasal inhaler 2 Sprays daily.    Yes Provider, Historical   Cholecalciferol, Vitamin D3, (VITAMIN D3) 1,000 unit cap Take 5,000 Units by mouth daily. Yes Provider, Historical     No Known Allergies    ROS  See above  Objective:     Patient-Reported Vitals 2/7/2022   Patient-Reported Weight 215   Patient-Reported Height 54   Patient-Reported Pulse 76   Patient-Reported Temperature 98.4   Patient-Reported SpO2 97      General: alert, cooperative, no distress   Mental  status: normal mood, behavior, speech, dress, motor activity, and thought processes, able to follow commands   HENT: Mild tenderness with palpation of maxillary sinus per patient   Neck: no visualized mass   Resp: no respiratory distress   Neuro: no gross deficits   Skin: no discoloration or lesions of concern on visible areas   Psychiatric: normal affect, consistent with stated mood, no evidence of hallucinations     Additional exam findings: We discussed the expected course, resolution and complications of the diagnosis(es) in detail. Medication risks, benefits, costs, interactions, and alternatives were discussed as indicated. I advised her to contact the office if her condition worsens, changes or fails to improve as anticipated. She expressed understanding with the diagnosis(es) and plan. Bing Barajas, was evaluated through a synchronous (real-time) audio-video encounter. The patient (or guardian if applicable) is aware that this is a billable service, which includes applicable co-pays. Verbal consent to proceed has been obtained. The visit was conducted pursuant to the emergency declaration under the [facility], [address] [place] [address] authority and the [employer] General Act. Patient identification was verified, and a caregiver was present when appropriate. The patient was located at home in a state where the provider was licensed to provide care.     Salvador Sher PA-C

## 2022-02-07 NOTE — PROGRESS NOTES
1. Have you been to the ER, urgent care clinic since your last visit? Hospitalized since your last visit? No    2. Have you seen or consulted any other health care providers outside of the [address] [place] [address] since your last visit? Include any pap smears or colon screening.  No     Chief Complaint   Patient presents with    Cold Symptoms     Please send link to [phone]

## 2022-02-21 ENCOUNTER — OFFICE VISIT (OUTPATIENT)
Dept: INTERNAL MEDICINE CLINIC | Age: 67
End: 2022-02-21
Payer: COMMERCIAL

## 2022-02-21 VITALS
SYSTOLIC BLOOD PRESSURE: 137 MMHG | DIASTOLIC BLOOD PRESSURE: 74 MMHG | BODY MASS INDEX: 35.85 KG/M2 | HEART RATE: 78 BPM | HEIGHT: 64 IN | TEMPERATURE: 98.2 F | WEIGHT: 210 LBS | RESPIRATION RATE: 14 BRPM

## 2022-02-21 DIAGNOSIS — E55.9 VITAMIN D DEFICIENCY: ICD-10-CM

## 2022-02-21 DIAGNOSIS — I10 ESSENTIAL HYPERTENSION: ICD-10-CM

## 2022-02-21 DIAGNOSIS — E78.00 PURE HYPERCHOLESTEROLEMIA: ICD-10-CM

## 2022-02-21 DIAGNOSIS — Z00.00 ROUTINE GENERAL MEDICAL EXAMINATION AT A HEALTH CARE FACILITY: Primary | ICD-10-CM

## 2022-02-21 DIAGNOSIS — E66.01 SEVERE OBESITY (BMI 35.0-39.9) WITH COMORBIDITY (HCC): ICD-10-CM

## 2022-02-21 DIAGNOSIS — F41.0 PANIC ATTACKS: ICD-10-CM

## 2022-02-21 DIAGNOSIS — R73.9 ELEVATED BLOOD SUGAR: ICD-10-CM

## 2022-02-21 PROCEDURE — 99397 PER PM REEVAL EST PAT 65+ YR: CPT | Performed by: INTERNAL MEDICINE

## 2022-02-21 NOTE — PROGRESS NOTES
Sukhdev Sheridan is a 74 y.o. female    Chief Complaint   Patient presents with    Physical     annual        Health Maintenance Due   Topic Date Due    Breast Cancer Screen Mammogram  12/03/2020       Visit Vitals  /74 (BP 1 Location: Left upper arm, BP Patient Position: Sitting)   Pulse 78   Temp 98.2 °F (36.8 °C) (Temporal)   Resp 14   Ht 5' 4\" (1.626 m)   Wt 210 lb (95.3 kg)   BMI 36.05 kg/m²       3 most recent PHQ Screens 2/21/2022   Little interest or pleasure in doing things Not at all   Feeling down, depressed, irritable, or hopeless Not at all   Total Score PHQ 2 0       Fall Risk Assessment, last 12 mths 2/21/2022   Able to walk? Yes   Fall in past 12 months? 0   Do you feel unsteady? 0   Are you worried about falling 0       Abuse Screening Questionnaire 2/21/2022   Do you ever feel afraid of your partner? N   Are you in a relationship with someone who physically or mentally threatens you? N   Is it safe for you to go home? Y         1. Have you been to the ER, urgent care clinic since your last visit? Hospitalized since your last visit?no    2. Have you seen or consulted any other health care providers outside of the [address] [place] [address] since your last visit? Include any pap smears or colon screening. yes ophthalmologist, GYN, and GI

## 2022-02-21 NOTE — PROGRESS NOTES
Subjective:   74 y.o. female for Well Woman Check. Her gyne and breast care is done elsewhere by her Ob-Gyne physician. Patient Active Problem List    Diagnosis Date Noted    Severe obesity ([address] 10/03/2018    Elevated blood sugar 2017    Advance directive discussed with patient 2016    Hyperlipemia 2011    Panic attacks 2011     Current Outpatient Medications   Medication Sig Dispense Refill    hydroCHLOROthiazide (HYDRODIURIL) 25 mg tablet TAKE 1 TABLET BY MOUTH EVERY DAY 90 Tablet 3    simvastatin (ZOCOR) 40 mg tablet TAKE 1 TABLET BY MOUTH EVERY DAY AT NIGHT 90 Tablet 3    DULoxetine (CYMBALTA) 60 mg capsule TAKE 1 CAPSULE BY MOUTH EVERY DAY 90 Cap 3    acetaminophen (TYLENOL ARTHRITIS PAIN) 650 mg TbER Take 650 mg by mouth two (2) times daily as needed.  Cetirizine (ZYRTEC) 10 mg cap Take 5 mg by mouth daily.  omeprazole (PRILOSEC) 20 mg capsule Take 20 mg by mouth daily. As needed      triamcinolone (NASACORT AQ) 55 mcg nasal inhaler 2 Sprays daily.  Cholecalciferol, Vitamin D3, (VITAMIN D3) 1,000 unit cap Take 5,000 Units by mouth daily.  cefdinir (OMNICEF) 300 mg capsule Take 1 Capsule by mouth two (2) times a day.  (Patient not taking: Reported on 2022) 20 Capsule 0     No Known Allergies  Past Medical History:   Diagnosis Date    Hypercholesterolemia     Panic attacks      Past Surgical History:   Procedure Laterality Date    ENDOSCOPY, COLON, DIAGNOSTIC  , 2014    Brand     Family History   Problem Relation Age of Onset    Cancer Mother         lung ( @ 67)   Carla Lobe Obesity Mother     Hypertension Mother     High Cholesterol Mother     Elevated Lipids Mother     Cancer Father         prostate    Parkinsonism Father      Social History     Tobacco Use    Smoking status: Former Smoker     Packs/day: 20.00     Years: 15.00     Pack years: 300.00     Types: Cigarettes     Quit date: 1987     Years since quittin.1    Smokeless tobacco: Never Used   Substance Use Topics    Alcohol use: No     Alcohol/week: 0.0 - 1.0 standard drinks        Lab Results   Component Value Date/Time    WBC 7.4 01/21/2021 10:11 AM    HGB 15.1 01/21/2021 10:11 AM    HCT 46.4 01/21/2021 10:11 AM    PLATELET 769 89/29/3291 10:11 AM    MCV 88 01/21/2021 10:11 AM     Lab Results   Component Value Date/Time    Hemoglobin A1c 6.1 (H) 08/23/2021 10:14 AM    Hemoglobin A1c 6.0 (H) 01/21/2021 10:11 AM    Hemoglobin A1c 6.0 (H) 10/08/2019 11:27 AM    Glucose 114 (H) 08/23/2021 10:14 AM    LDL, calculated 120 (H) 08/23/2021 10:14 AM    LDL, calculated 116 (H) 10/08/2019 11:27 AM    Creatinine 1.03 (H) 08/23/2021 10:14 AM      Lab Results   Component Value Date/Time    Cholesterol, total 193 08/23/2021 10:14 AM    HDL Cholesterol 49 08/23/2021 10:14 AM    LDL, calculated 120 (H) 08/23/2021 10:14 AM    LDL, calculated 116 (H) 10/08/2019 11:27 AM    Triglyceride 133 08/23/2021 10:14 AM     Lab Results   Component Value Date/Time    ALT (SGPT) 15 08/23/2021 10:14 AM    Alk. phosphatase 98 08/23/2021 10:14 AM    Bilirubin, direct 0.09 06/24/2015 10:03 AM    Bilirubin, total 0.3 08/23/2021 10:14 AM    Albumin 4.2 08/23/2021 10:14 AM    Protein, total 7.2 08/23/2021 10:14 AM    PLATELET 698 60/76/9835 10:11 AM     Lab Results   Component Value Date/Time    GFR est non-AA 56 (L) 08/23/2021 10:14 AM    GFR est AA 65 08/23/2021 10:14 AM    Creatinine 1.03 (H) 08/23/2021 10:14 AM    BUN 16 08/23/2021 10:14 AM    Sodium 140 08/23/2021 10:14 AM    Potassium 4.9 08/23/2021 10:14 AM    Chloride 97 08/23/2021 10:14 AM    CO2 28 08/23/2021 10:14 AM     Lab Results   Component Value Date/Time    TSH 1.670 01/21/2021 10:11 AM         Specific concerns today:   Sinus infection. Saw TPL 2/7, placed on Omnicef and feeling better. Had 2 negative COVID tests. Had colonoscopy through Dr. Brian Jacobs end of January at [address]. No polyps. F/u 10 years.     Also saw eye doctor, Dr. Vladislav Gutierres at [facility]. Mammogram completed [facility] at [facility]. Blayne Layne Review of Systems  Constitutional: negative except for low grade temps and fatituge with sinusitis. Eyes: negative except for contacts/glasses  Ears, nose, mouth, throat, and face: negative  Respiratory: negative  Cardiovascular: negative  Gastrointestinal: negative  Genitourinary:negative  Integument/breast: negative  Hematologic/lymphatic: negative  Musculoskeletal:negative except for knee pain intermittently  Neurological: negative except for mild numbness left 1-3rd fingers  Behavioral/Psych: negative except for panic controlled with current medications. Endocrine: negative  Allergic/Immunologic: negative    Objective:   Blood pressure 137/74, pulse 78, temperature 98.2 °F (36.8 °C), temperature source Temporal, resp. rate 14, height 5' 4\" (1.626 m), weight 210 lb (95.3 kg).    Physical Examination:   General appearance - alert, well appearing, and in no distress and oriented to person, place, and time  Mental status - alert, oriented to person, place, and time, normal mood, behavior, speech, dress, motor activity, and thought processes  Eyes - pupils equal and reactive, extraocular eye movements intact  Ears - bilateral TM's and external ear canals normal  Nose - normal and patent, no erythema, discharge or polyps  Mouth - mucous membranes moist, pharynx normal without lesions  Neck - supple, no significant adenopathy, carotids upstroke normal bilaterally, no bruits, thyroid exam: thyroid is normal in size without nodules or tenderness  Lymphatics - no palpable lymphadenopathy, no hepatosplenomegaly  Chest - clear to auscultation, no wheezes, rales or rhonchi, symmetric air entry  Heart - normal rate, regular rhythm, normal S1, S2, no murmurs, rubs, clicks or gallops  Abdomen - soft, nontender, nondistended, no masses or organomegaly  bowel sounds normal  Breasts - breasts appear normal, no suspicious masses, no skin or nipple changes or axillary nodes  Back exam - full range of motion, no tenderness, palpable spasm or pain on motion  Neurological - alert, oriented, normal speech, no focal findings or movement disorder noted  Musculoskeletal - no joint tenderness, deformity or swelling  Extremities - peripheral pulses normal, no pedal edema, no clubbing or cyanosis  Skin - normal coloration and turgor, no rashes, no suspicious skin lesions noted     Assessment/Plan:   65yo female   call if any problems  Diagnoses and all orders for this visit:    1. Routine general medical examination at a health care facility  -     METABOLIC PANEL, COMPREHENSIVE; Future  -     LIPID PANEL; Future  -     CBC W/O DIFF; Future  -     TSH 3RD GENERATION; Future  -     VITAMIN D, 25 HYDROXY; Future    2. Severe obesity (BMI 35.0-39. 9) with comorbidity ([address] - lost 8 lbs in the last year. Continue to work on diet and exercise    3. Vitamin D deficiency - continue supplements  -     VITAMIN D, 25 HYDROXY; Future    4. Elevated blood sugar - working on idet and exerDocuratede  -     HEMOGLOBIN A1C WITH EAG; Future    5. Essential hypertension - controlled, cont same meds. Check labs    6. Pure hypercholesterolemia - controlled, cont same meds    7. Panic attacks - controlled, cont same    8. Recent sinusitis - improving. Follow-up and Dispositions    · Return in about 6 months (around 8/21/2022).

## 2022-02-23 LAB
25(OH)D3+25(OH)D2 SERPL-MCNC: 62.6 NG/ML (ref 30–100)
ALBUMIN SERPL-MCNC: 4.3 G/DL (ref 3.8–4.8)
ALBUMIN/GLOB SERPL: 1.4 {RATIO} (ref 1.2–2.2)
ALP SERPL-CCNC: 106 IU/L (ref 44–121)
ALT SERPL-CCNC: 16 IU/L (ref 0–32)
AST SERPL-CCNC: 19 IU/L (ref 0–40)
BILIRUB SERPL-MCNC: 0.4 MG/DL (ref 0–1.2)
BUN SERPL-MCNC: 14 MG/DL (ref 8–27)
BUN/CREAT SERPL: 13 (ref 12–28)
CALCIUM SERPL-MCNC: 10.2 MG/DL (ref 8.7–10.3)
CHLORIDE SERPL-SCNC: 96 MMOL/L (ref 96–106)
CHOLEST SERPL-MCNC: 205 MG/DL (ref 100–199)
CO2 SERPL-SCNC: 23 MMOL/L (ref 20–29)
CREAT SERPL-MCNC: 1.04 MG/DL (ref 0.57–1)
ERYTHROCYTE [DISTWIDTH] IN BLOOD BY AUTOMATED COUNT: 13.6 % (ref 11.7–15.4)
EST. AVERAGE GLUCOSE BLD GHB EST-MCNC: 134 MG/DL
GLOBULIN SER CALC-MCNC: 3.1 G/DL (ref 1.5–4.5)
GLUCOSE SERPL-MCNC: 119 MG/DL (ref 65–99)
HBA1C MFR BLD: 6.3 % (ref 4.8–5.6)
HCT VFR BLD AUTO: 45.2 % (ref 34–46.6)
HDLC SERPL-MCNC: 51 MG/DL
HGB BLD-MCNC: 14.5 G/DL (ref 11.1–15.9)
IMP & REVIEW OF LAB RESULTS: NORMAL
INTERPRETATION: NORMAL
LDLC SERPL CALC-MCNC: 136 MG/DL (ref 0–99)
MCH RBC QN AUTO: 27.7 PG (ref 26.6–33)
MCHC RBC AUTO-ENTMCNC: 32.1 G/DL (ref 31.5–35.7)
MCV RBC AUTO: 86 FL (ref 79–97)
PLATELET # BLD AUTO: 318 X10E3/UL (ref 150–450)
POTASSIUM SERPL-SCNC: 4.1 MMOL/L (ref 3.5–5.2)
PROT SERPL-MCNC: 7.4 G/DL (ref 6–8.5)
RBC # BLD AUTO: 5.23 X10E6/UL (ref 3.77–5.28)
SODIUM SERPL-SCNC: 139 MMOL/L (ref 134–144)
TRIGL SERPL-MCNC: 101 MG/DL (ref 0–149)
TSH SERPL DL<=0.005 MIU/L-ACNC: 2.76 UIU/ML (ref 0.45–4.5)
VLDLC SERPL CALC-MCNC: 18 MG/DL (ref 5–40)
WBC # BLD AUTO: 7.8 X10E3/UL (ref 3.4–10.8)

## 2022-03-19 PROBLEM — R73.9 ELEVATED BLOOD SUGAR: Status: ACTIVE | Noted: 2017-01-19

## 2022-03-19 PROBLEM — E66.01 SEVERE OBESITY (HCC): Status: ACTIVE | Noted: 2018-10-03

## 2022-04-21 RX ORDER — DULOXETIN HYDROCHLORIDE 60 MG/1
CAPSULE, DELAYED RELEASE ORAL
Qty: 90 CAPSULE | Refills: 3 | Status: SHIPPED | OUTPATIENT
Start: 2022-04-21

## 2022-06-16 RX ORDER — SIMVASTATIN 40 MG/1
TABLET, FILM COATED ORAL
Qty: 90 TABLET | Refills: 3 | Status: SHIPPED | OUTPATIENT
Start: 2022-06-16

## 2022-09-09 ENCOUNTER — TELEPHONE (OUTPATIENT)
Dept: INTERNAL MEDICINE CLINIC | Age: 67
End: 2022-09-09

## 2022-09-09 ENCOUNTER — OFFICE VISIT (OUTPATIENT)
Dept: INTERNAL MEDICINE CLINIC | Age: 67
End: 2022-09-09
Payer: COMMERCIAL

## 2022-09-09 VITALS
HEART RATE: 79 BPM | DIASTOLIC BLOOD PRESSURE: 78 MMHG | RESPIRATION RATE: 14 BRPM | TEMPERATURE: 98.2 F | OXYGEN SATURATION: 98 % | BODY MASS INDEX: 35.51 KG/M2 | SYSTOLIC BLOOD PRESSURE: 139 MMHG | HEIGHT: 64 IN | WEIGHT: 208 LBS

## 2022-09-09 DIAGNOSIS — G56.02 CARPAL TUNNEL SYNDROME ON LEFT: ICD-10-CM

## 2022-09-09 DIAGNOSIS — E66.01 SEVERE OBESITY (HCC): ICD-10-CM

## 2022-09-09 DIAGNOSIS — R73.9 ELEVATED BLOOD SUGAR: ICD-10-CM

## 2022-09-09 DIAGNOSIS — I10 ESSENTIAL HYPERTENSION: Primary | ICD-10-CM

## 2022-09-09 DIAGNOSIS — F41.0 PANIC ATTACKS: ICD-10-CM

## 2022-09-09 DIAGNOSIS — E78.00 PURE HYPERCHOLESTEROLEMIA: ICD-10-CM

## 2022-09-09 PROCEDURE — 99214 OFFICE O/P EST MOD 30 MIN: CPT | Performed by: INTERNAL MEDICINE

## 2022-09-09 PROCEDURE — G0463 HOSPITAL OUTPT CLINIC VISIT: HCPCS | Performed by: INTERNAL MEDICINE

## 2022-09-15 ENCOUNTER — TELEPHONE (OUTPATIENT)
Dept: INTERNAL MEDICINE CLINIC | Age: 67
End: 2022-09-15

## 2022-09-15 LAB
ALBUMIN SERPL-MCNC: 4.4 G/DL (ref 3.8–4.8)
ALBUMIN/GLOB SERPL: 1.5 {RATIO} (ref 1.2–2.2)
ALP SERPL-CCNC: 104 IU/L (ref 44–121)
ALT SERPL-CCNC: 18 IU/L (ref 0–32)
AST SERPL-CCNC: 25 IU/L (ref 0–40)
BILIRUB SERPL-MCNC: 0.4 MG/DL (ref 0–1.2)
BUN SERPL-MCNC: 15 MG/DL (ref 8–27)
BUN/CREAT SERPL: 15 (ref 12–28)
CALCIUM SERPL-MCNC: 9.9 MG/DL (ref 8.7–10.3)
CHLORIDE SERPL-SCNC: 93 MMOL/L (ref 96–106)
CHOLEST SERPL-MCNC: 191 MG/DL (ref 100–199)
CO2 SERPL-SCNC: 31 MMOL/L (ref 20–29)
CREAT SERPL-MCNC: 1.03 MG/DL (ref 0.57–1)
EGFR: 59 ML/MIN/1.73
EST. AVERAGE GLUCOSE BLD GHB EST-MCNC: 131 MG/DL
GLOBULIN SER CALC-MCNC: 2.9 G/DL (ref 1.5–4.5)
GLUCOSE SERPL-MCNC: 102 MG/DL (ref 65–99)
HBA1C MFR BLD: 6.2 % (ref 4.8–5.6)
HDLC SERPL-MCNC: 54 MG/DL
IMP & REVIEW OF LAB RESULTS: NORMAL
INTERPRETATION: NORMAL
LDLC SERPL CALC-MCNC: 119 MG/DL (ref 0–99)
POTASSIUM SERPL-SCNC: 4.3 MMOL/L (ref 3.5–5.2)
PROT SERPL-MCNC: 7.3 G/DL (ref 6–8.5)
SODIUM SERPL-SCNC: 137 MMOL/L (ref 134–144)
TRIGL SERPL-MCNC: 97 MG/DL (ref 0–149)
VLDLC SERPL CALC-MCNC: 18 MG/DL (ref 5–40)

## 2022-09-15 NOTE — TELEPHONE ENCOUNTER
Patient calling to report that her insurance has changed. She states that she thought current insurance would be in effect to 10/1/22 but it was not. She reports that her visit on 9/9/22 is not currently covered. Informed patient that she may update her insurance however that there is a billing number she can call to let them know that the visit for 9/9/22 should be sent to her new insurance. Advised to call [phone].

## 2022-10-25 NOTE — H&P
CARE TEAM:  Patient Care Team:  Adam Montana MD as PCP - General (Internal Medicine)    ASSESSMENT:  1. Bilateral carpal tunnel syndrome       Patient Active Problem List   Diagnosis   Advance directive discussed with patient   Elevated blood sugar   Hyperlipemia   Panic attacks   Seasonal allergies   Severe obesity     PLAN:  Treatment Plan: I have recommended surgical management. Left endoscopic carpal tunnel release under local anesthesia. We discussed reasonable expectations. We discussed usual postoperative course. She has given informed consent to proceed    No orders of the defined types were placed in this encounter. Follow-up: Return for first postoperative visit. HISTORY OF PRESENT ILLNESS:  Chief Complaint: Follow-up of the Left Wrist and Follow-up of the Right Wrist    Age: 69 y.o. Sex: female   History of present illness: Elizabeth Fu is a pleasant 69 y.o. female who presents today for evaluation of bilateral hands. Left worse than right. Recently evaluated for carpal tunnel syndrome. She had a recent nerve conduction study and EMG. Past medical history, past surgical history, medications, allergies, social history, and review of systems have been reviewed by me. No current facility-administered medications on file prior to encounter. Current Outpatient Medications on File Prior to Encounter   Medication Sig Dispense Refill    simvastatin (ZOCOR) 40 mg tablet TAKE 1 TABLET BY MOUTH EVERY DAY AT NIGHT 90 Tablet 3    DULoxetine (CYMBALTA) 60 mg capsule TAKE 1 CAPSULE BY MOUTH EVERY DAY 90 Capsule 3    hydroCHLOROthiazide (HYDRODIURIL) 25 mg tablet TAKE 1 TABLET BY MOUTH EVERY DAY 90 Tablet 3    acetaminophen (TYLENOL) 650 mg TbER Take 650 mg by mouth two (2) times daily as needed. Cetirizine 10 mg cap Take 5 mg by mouth daily. omeprazole (PRILOSEC) 20 mg capsule Take 20 mg by mouth daily. As needed      triamcinolone (NASACORT AQ) 55 mcg nasal inhaler 2 Sprays daily. cholecalciferol (VITAMIN D3) 25 mcg (1,000 unit) cap Take 5,000 Units by mouth daily. Past Medical History:   Diagnosis Date    Hypercholesterolemia     Panic attacks        No Known Allergies    Social History     Socioeconomic History    Marital status:      Spouse name: Not on file    Number of children: Not on file    Years of education: Not on file    Highest education level: Not on file   Occupational History    Not on file   Tobacco Use    Smoking status: Former     Packs/day: 20.00     Years: 15.00     Pack years: 300.00     Types: Cigarettes     Quit date: 1987     Years since quittin.8    Smokeless tobacco: Never   Vaping Use    Vaping Use: Never used   Substance and Sexual Activity    Alcohol use: No     Alcohol/week: 0.0 - 1.0 standard drinks    Drug use: No    Sexual activity: Yes     Partners: Male   Other Topics Concern    Not on file   Social History Narrative    Not on file     Social Determinants of Health     Financial Resource Strain: Not on file   Food Insecurity: Not on file   Transportation Needs: Not on file   Physical Activity: Not on file   Stress: Not on file   Social Connections: Not on file   Intimate Partner Violence: Not on file   Housing Stability: Not on file         Review of Systems   10/10/2022    Constitutional: Unexplained: Negative  Genitourinary: Frequent Urination: Negative  HEENT: Vision Loss: Negative  Neurological: Memory Loss: Negative  Integumentary: Rash: Negative  Cardiovascular: Palpatations: Negative  Hematologic: Bruises/Bleeds Easily: Negative  Gastrointestinal: Constipation: Negative  Immunological: Seasonal Allergies: Positive  Musculoskeletal: Joint Pain: Positive     OBJECTIVE:  Constitutional: No acute distress. Pleasant and cooperative with exam.    Musculoskeletal     Her exam is unchanged.  She has a brace on the left wrist.    IMAGING / STUDIES:        No imaging obtained     Independently reviewed her nerve conduction study and EMG from earlier today. She has moderate to severe left carpal tunnel syndrome. Moderate on the right.

## 2022-10-26 ENCOUNTER — HOSPITAL ENCOUNTER (OUTPATIENT)
Age: 67
Setting detail: OUTPATIENT SURGERY
Discharge: HOME | End: 2022-10-26
Attending: ORTHOPAEDIC SURGERY | Admitting: ORTHOPAEDIC SURGERY
Payer: MEDICARE

## 2022-10-26 VITALS
WEIGHT: 206 LBS | DIASTOLIC BLOOD PRESSURE: 84 MMHG | OXYGEN SATURATION: 97 % | TEMPERATURE: 98.1 F | HEART RATE: 78 BPM | SYSTOLIC BLOOD PRESSURE: 135 MMHG | BODY MASS INDEX: 35.17 KG/M2 | HEIGHT: 64 IN | RESPIRATION RATE: 18 BRPM

## 2022-10-26 DIAGNOSIS — G56.02 LEFT CARPAL TUNNEL SYNDROME: Primary | ICD-10-CM

## 2022-10-26 PROCEDURE — 74011000250 HC RX REV CODE- 250: Performed by: ORTHOPAEDIC SURGERY

## 2022-10-26 PROCEDURE — 77030002916 HC SUT ETHLN J&J -A: Performed by: ORTHOPAEDIC SURGERY

## 2022-10-26 PROCEDURE — 2709999900 HC NON-CHARGEABLE SUPPLY: Performed by: ORTHOPAEDIC SURGERY

## 2022-10-26 PROCEDURE — 77030006602 HC BLD CRPL AGEE MCRA -C: Performed by: ORTHOPAEDIC SURGERY

## 2022-10-26 PROCEDURE — 77030020754 HC CUF TRNQT 2BLA STRY -B: Performed by: ORTHOPAEDIC SURGERY

## 2022-10-26 PROCEDURE — 76030000002 HC AMB SURG OR TIME FIRST 0.: Performed by: ORTHOPAEDIC SURGERY

## 2022-10-26 PROCEDURE — 76210000046 HC AMBSU PH II REC FIRST 0.5 HR: Performed by: ORTHOPAEDIC SURGERY

## 2022-10-26 PROCEDURE — 77030040356 HC CORD BPLR FRCP COVD -A: Performed by: ORTHOPAEDIC SURGERY

## 2022-10-26 RX ORDER — TRAMADOL HYDROCHLORIDE 50 MG/1
50 TABLET ORAL
Qty: 12 TABLET | Refills: 0 | Status: SHIPPED | OUTPATIENT
Start: 2022-10-26 | End: 2022-10-29

## 2022-10-26 NOTE — PERIOP NOTES
Reviewed discharge instructions with patient. The patient verbalized understanding. Paper copy given to patient. No further questions at this time.

## 2022-10-26 NOTE — OP NOTES
PREOPERATIVE DIAGNOSIS: Left carpal tunnel syndrome. POSTOPERATIVE DIAGNOSIS: Left carpal tunnel syndrome. PROCEDURES PERFORMED: Endoscopic left carpal tunnel release. SURGEON: Raf Hernandez. Blair Terrazas MD     ASSISTANT: None    ANESTHESIA: Local.     ESTIMATED BLOOD LOSS: Minimal.     SPECIMENS REMOVED: None. COMPLICATIONS: None. IMPLANTS: None. INDICATIONS FOR PROCEDURE: This is a 69year-old female with   electrodiagnostic and clinical evidence of carpal tunnel syndrome. She has failed   conservative treatment, and wishes to proceed with endoscopic carpal tunnel   release. After discussion of the risks, benefits, potential complications   and alternatives to surgery, she has elected to proceed. DESCRIPTION OF PROCEDURE: The patient was identified in the preoperative   holding area. Informed consent was obtained, and the operative site was   marked. The planned surgical site was anesthetized with local anesthesia. The patient was then transferred to the OR, and placed supine on the   operating table. The left upper   extremity was sterilely prepped and draped in the usual fashion. A surgical   time-out was held, and the operative site was confirmed. Preoperative   antibiotics were not given. After Esmarch exsanguination, the tourniquet was   elevated to 250 mmHg. A transverse incision was made just proximal to the   wrist flexion crease. Dissection was carried down through skin and   subcutaneous tissues, controlling bleeding with electrocautery. The   antebrachial fascia was incised sharply with a 15-blade. The proximal   aspect of the antebrachial fascia was then divided under direct   visualization. The carpal canal was entered - first with a synovial rasp,   and then serial dilators. The endoscope was placed. Good visualization of   the transverse carpal ligament was easily obtained. The transverse carpal   ligament was then sharply divided, starting distally and working   proximally. The 50% distal aspect of the ligament was transected. The   endoscope was then placed back into the carpal canal. Good release was   evident. The remaining 50% was then transected with the endoscope. The   endoscope was removed. The wound was thoroughly irrigated. The wound was then closed   with 4-0 nylon sutures. Sterile dressings were applied. The tourniquet was let down and brisk cap refill returned to the digits. She tolerated the entire procedure well without complications.

## 2022-10-26 NOTE — INTERVAL H&P NOTE
Update History & Physical    The Patient's History and Physical was reviewed with the patient and I examined the patient. There was no change. The surgical site was confirmed by the patient and me. Plan:  The risk, benefits, expected outcome, and alternative to the recommended procedure have been discussed with the patient. Patient understands and wants to proceed with the procedure.     Electronically signed by Farhat Smith MD on 10/26/2022 at 7:28 AM

## 2022-10-26 NOTE — ROUTINE PROCESS
Patient: Naye Harvey MRN: [id number]  SSN: [id number]   YOB: 1955  Age: 69 y.o. Sex: female     Patient is status post Procedure(s):  LEFT ENDOSCOPIC CARPAL TUNNEL RELEASE.     Surgeon(s) and Role:     * Александр Henson MD - Primary    Local/Dose/Irrigation:  SEE MAR                                         Dressing/Packing:  Incision 10/26/22 Hand Left-Dressing/Treatment: Ace wrap;Cast padding;Gauze dressing/dressing sponge;Xeroform (10/26/22 0742)    Splint/Cast:  ]    Other:

## 2022-10-26 NOTE — DISCHARGE INSTRUCTIONS
Dr. Chris Gardiner Postoperative Instructions    Please maintain the dressing placed at surgery for 5 days. You may remove it in 5 days. Please keep the dressing clean and dry for 5 days. In 5 days you may get the wound wet and then cover it with a bandaid. If you have any questions or problems with your dressing, please call our office at [phone]. Please elevate the operative extremity to the level of the heart to keep swelling at a minimum. You may get up to move around, but when seated please keep the extremity elevated as much as possible. This will decrease swelling and postoperative pain. You may do activities as tolerated. You may ice your arm/hand 5 times a day for 20 minutes at a time. A prescription for pain medication has been sent to your pharmacy. Take it as needed. You may also use over the counter medications for pain. Signs and symptoms of infection include: redness, increased pain, increased swelling not relieved by elevation, drainage, fever, or chills, If you develop any of these symptoms, call the office at [phone]. Please Follow-up at my office 14 days after surgery or when specified at your pre-operative appointment.

## 2023-01-26 RX ORDER — HYDROCHLOROTHIAZIDE 25 MG/1
TABLET ORAL
Qty: 90 TABLET | Refills: 3 | Status: SHIPPED | OUTPATIENT
Start: 2023-01-26

## 2023-02-09 ENCOUNTER — TELEPHONE (OUTPATIENT)
Dept: INTERNAL MEDICINE CLINIC | Age: 68
End: 2023-02-09

## 2023-02-20 NOTE — PROGRESS NOTES
Casper Soriano, who was evaluated through a synchronous (real-time) audio-video encounter, and/or her healthcare decision maker, is aware that it is a billable service, which includes applicable co-pays, with coverage as determined by her insurance carrier. She provided verbal consent to proceed and patient identification was verified. This visit was conducted pursuant to the emergency declaration under the [facility] [address] and the [employer] General Act. A caregiver was present when appropriate. Ability to conduct physical exam was limited. The patient was located at: Home: [address] [place] [address] Rd[phone]  The provider was located at: Home: Keegan Field MD on 2/20/2023 at 1:04 PM    Subjective:     Casper Soriano is a 69 y.o. female who presents for follow up of hypertension, hyperlipidemia, and elevated blood sugar. Diet and Lifestyle: sedentary  Home BP Monitoring: is not measured at home    Cardiovascular ROS: taking medications as instructed, no medication side effects noted, no TIA's, no chest pain on exertion, no dyspnea on exertion, no swelling of ankles, no orthostatic dizziness or lightheadedness, no palpitations    New concerns:   Had L CT release with Dr. Yoshi Thomas. Right wrist had mild CTS. Tingling and numbness has resolved. Occ discomfort.  is good. Since first of the year increasing pain in left knee. Banged right knee on car and now it is painful as well. Last week saw Dr. Stephen Neville. Bone on bone DJD. Started Advil 400mg bid with prilosec once a day for 2 weeks. Knees felt better. Going to see surgeon on Friday. Had colonoscopy with Dr. Christa Smith in September 2021 or 2022. Mammogram and DXA December 2022 through [facility].       Current Outpatient Medications   Medication Sig Dispense Refill    hydroCHLOROthiazide (HYDRODIURIL) 25 mg tablet TAKE 1 TABLET BY MOUTH EVERY DAY 90 Tablet 3    simvastatin (ZOCOR) 40 mg tablet TAKE 1 TABLET BY MOUTH EVERY DAY AT NIGHT 90 Tablet 3    DULoxetine (CYMBALTA) 60 mg capsule TAKE 1 CAPSULE BY MOUTH EVERY DAY 90 Capsule 3    acetaminophen (TYLENOL) 650 mg TbER Take 650 mg by mouth two (2) times daily as needed. Cetirizine 10 mg cap Take 5 mg by mouth daily. omeprazole (PRILOSEC) 20 mg capsule Take 20 mg by mouth daily. As needed      triamcinolone (NASACORT AQ) 55 mcg nasal inhaler 2 Sprays daily. cholecalciferol (VITAMIN D3) 25 mcg (1,000 unit) cap Take 5,000 Units by mouth daily. Lab Results   Component Value Date/Time    Hemoglobin A1c 6.2 (H) 09/14/2022 10:38 AM    Hemoglobin A1c 6.3 (H) 02/22/2022 09:39 AM    Hemoglobin A1c 6.1 (H) 08/23/2021 10:14 AM    Glucose 102 (H) 09/14/2022 10:38 AM    LDL, calculated 119 (H) 09/14/2022 10:38 AM    LDL, calculated 116 (H) 10/08/2019 11:27 AM    Creatinine 1.03 (H) 09/14/2022 10:38 AM      Lab Results   Component Value Date/Time    Cholesterol, total 191 09/14/2022 10:38 AM    HDL Cholesterol 54 09/14/2022 10:38 AM    LDL, calculated 119 (H) 09/14/2022 10:38 AM    LDL, calculated 116 (H) 10/08/2019 11:27 AM    Triglyceride 97 09/14/2022 10:38 AM     Lab Results   Component Value Date/Time    ALT (SGPT) 18 09/14/2022 10:38 AM    Alk.  phosphatase 104 09/14/2022 10:38 AM    Bilirubin, direct 0.09 06/24/2015 10:03 AM    Bilirubin, total 0.4 09/14/2022 10:38 AM    Albumin 4.4 09/14/2022 10:38 AM    Protein, total 7.3 09/14/2022 10:38 AM    PLATELET 354 00/80/7176 09:39 AM       Lab Results   Component Value Date/Time    GFR est non-AA 55 (L) 02/22/2022 09:39 AM    GFR est AA 64 02/22/2022 09:39 AM    Creatinine 1.03 (H) 09/14/2022 10:38 AM    BUN 15 09/14/2022 10:38 AM    Sodium 137 09/14/2022 10:38 AM    Potassium 4.3 09/14/2022 10:38 AM    Chloride 93 (L) 09/14/2022 10:38 AM    CO2 31 (H) 09/14/2022 10:38 AM        Review of Systems, additional:  Pertinent items are noted in HPI. Objective: There were no vitals taken for this visit. Patient-Reported Vitals 2/7/2022   Patient-Reported Weight 215   Patient-Reported Height 54   Patient-Reported Pulse 76   Patient-Reported Temperature 98.4   Patient-Reported SpO2 97      Appearance: alert, well appearing, and in no distress and oriented to person, place, and time. General exam:   NECK - nml appearance  PULM - nml rate and effort       Assessment/Plan:        Diagnoses and all orders for this visit:    1. Essential hypertension - well controlled, cont HCTZ 25mg every day. Pt will purchase cuff and check BP at home. Check labs. -     METABOLIC PANEL, COMPREHENSIVE; Future  -     LIPID PANEL; Future    2. Pure hypercholesterolemia - controlled, continue Simvastatin 40mg daily. Repeat labs. -     METABOLIC PANEL, COMPREHENSIVE; Future  -     LIPID PANEL; Future    3. Elevated blood sugar - discussed diet and exercise for BS control. Repeat labs  -     METABOLIC PANEL, COMPREHENSIVE; Future  -     LIPID PANEL; Future  -     HEMOGLOBIN A1C WITH EAG; Future    4. Severe obesity (BMI 35.0-39. 9) with comorbidity ([address] - discusssed diet and exercise for weight loss. .      5. Vitamin D deficiency - repeat labs, cont supplements. -     VITAMIN D, 25 HYDROXY; Future    6. OA bilat knees - follow up ortho.

## 2023-02-21 ENCOUNTER — VIRTUAL VISIT (OUTPATIENT)
Dept: INTERNAL MEDICINE CLINIC | Age: 68
End: 2023-02-21
Payer: MEDICARE

## 2023-02-21 DIAGNOSIS — I10 ESSENTIAL HYPERTENSION: Primary | ICD-10-CM

## 2023-02-21 DIAGNOSIS — E78.00 PURE HYPERCHOLESTEROLEMIA: ICD-10-CM

## 2023-02-21 DIAGNOSIS — E55.9 VITAMIN D DEFICIENCY: ICD-10-CM

## 2023-02-21 DIAGNOSIS — R73.9 ELEVATED BLOOD SUGAR: ICD-10-CM

## 2023-02-21 DIAGNOSIS — E66.01 SEVERE OBESITY (BMI 35.0-39.9) WITH COMORBIDITY (HCC): ICD-10-CM

## 2023-02-21 PROCEDURE — G8399 PT W/DXA RESULTS DOCUMENT: HCPCS | Performed by: INTERNAL MEDICINE

## 2023-02-21 PROCEDURE — G8427 DOCREV CUR MEDS BY ELIG CLIN: HCPCS | Performed by: INTERNAL MEDICINE

## 2023-02-21 PROCEDURE — G8417 CALC BMI ABV UP PARAM F/U: HCPCS | Performed by: INTERNAL MEDICINE

## 2023-02-21 PROCEDURE — G0463 HOSPITAL OUTPT CLINIC VISIT: HCPCS | Performed by: INTERNAL MEDICINE

## 2023-02-21 PROCEDURE — 3017F COLORECTAL CA SCREEN DOC REV: CPT | Performed by: INTERNAL MEDICINE

## 2023-02-21 PROCEDURE — G8432 DEP SCR NOT DOC, RNG: HCPCS | Performed by: INTERNAL MEDICINE

## 2023-02-21 PROCEDURE — G8536 NO DOC ELDER MAL SCRN: HCPCS | Performed by: INTERNAL MEDICINE

## 2023-02-21 PROCEDURE — 99214 OFFICE O/P EST MOD 30 MIN: CPT | Performed by: INTERNAL MEDICINE

## 2023-02-21 PROCEDURE — 1090F PRES/ABSN URINE INCON ASSESS: CPT | Performed by: INTERNAL MEDICINE

## 2023-02-21 PROCEDURE — 1101F PT FALLS ASSESS-DOCD LE1/YR: CPT | Performed by: INTERNAL MEDICINE

## 2023-04-17 RX ORDER — DULOXETIN HYDROCHLORIDE 60 MG/1
CAPSULE, DELAYED RELEASE ORAL
Qty: 90 CAPSULE | Refills: 3 | Status: SHIPPED | OUTPATIENT
Start: 2023-04-17

## 2023-05-17 RX ORDER — HYDROCHLOROTHIAZIDE 25 MG/1
1 TABLET ORAL DAILY
COMMUNITY
Start: 2023-01-26

## 2023-05-17 RX ORDER — OMEPRAZOLE 20 MG/1
20 CAPSULE, DELAYED RELEASE ORAL DAILY
COMMUNITY

## 2023-05-17 RX ORDER — TRIAMCINOLONE ACETONIDE 55 UG/1
2 SPRAY, METERED NASAL DAILY
COMMUNITY

## 2023-05-17 RX ORDER — SIMVASTATIN 40 MG
1 TABLET ORAL NIGHTLY
COMMUNITY
Start: 2022-06-16 | End: 2023-06-26

## 2023-05-17 RX ORDER — DULOXETIN HYDROCHLORIDE 60 MG/1
1 CAPSULE, DELAYED RELEASE ORAL DAILY
COMMUNITY
Start: 2023-04-17

## 2023-05-17 RX ORDER — SENNOSIDES 8.6 MG
650 CAPSULE ORAL 2 TIMES DAILY PRN
COMMUNITY

## 2023-05-25 ENCOUNTER — TELEPHONE (OUTPATIENT)
Age: 68
End: 2023-05-25

## 2023-06-05 RX ORDER — SIMVASTATIN 40 MG
TABLET ORAL
Qty: 90 TABLET | Refills: 3 | OUTPATIENT
Start: 2023-06-05

## 2023-06-26 RX ORDER — SIMVASTATIN 40 MG
TABLET ORAL
Qty: 90 TABLET | Refills: 3 | Status: SHIPPED | OUTPATIENT
Start: 2023-06-26

## 2023-09-13 SDOH — ECONOMIC STABILITY: FOOD INSECURITY: WITHIN THE PAST 12 MONTHS, THE FOOD YOU BOUGHT JUST DIDN'T LAST AND YOU DIDN'T HAVE MONEY TO GET MORE.: NEVER TRUE

## 2023-09-13 SDOH — ECONOMIC STABILITY: INCOME INSECURITY: HOW HARD IS IT FOR YOU TO PAY FOR THE VERY BASICS LIKE FOOD, HOUSING, MEDICAL CARE, AND HEATING?: NOT HARD AT ALL

## 2023-09-13 SDOH — ECONOMIC STABILITY: FOOD INSECURITY: WITHIN THE PAST 12 MONTHS, YOU WORRIED THAT YOUR FOOD WOULD RUN OUT BEFORE YOU GOT MONEY TO BUY MORE.: NEVER TRUE

## 2023-09-13 SDOH — HEALTH STABILITY: PHYSICAL HEALTH: ON AVERAGE, HOW MANY MINUTES DO YOU ENGAGE IN EXERCISE AT THIS LEVEL?: 0 MIN

## 2023-09-13 SDOH — ECONOMIC STABILITY: TRANSPORTATION INSECURITY
IN THE PAST 12 MONTHS, HAS LACK OF TRANSPORTATION KEPT YOU FROM MEETINGS, WORK, OR FROM GETTING THINGS NEEDED FOR DAILY LIVING?: NO

## 2023-09-13 SDOH — ECONOMIC STABILITY: HOUSING INSECURITY
IN THE LAST 12 MONTHS, WAS THERE A TIME WHEN YOU DID NOT HAVE A STEADY PLACE TO SLEEP OR SLEPT IN A SHELTER (INCLUDING NOW)?: NO

## 2023-09-13 SDOH — HEALTH STABILITY: PHYSICAL HEALTH: ON AVERAGE, HOW MANY DAYS PER WEEK DO YOU ENGAGE IN MODERATE TO STRENUOUS EXERCISE (LIKE A BRISK WALK)?: 0 DAYS

## 2023-09-13 ASSESSMENT — PATIENT HEALTH QUESTIONNAIRE - PHQ9
SUM OF ALL RESPONSES TO PHQ QUESTIONS 1-9: 0
SUM OF ALL RESPONSES TO PHQ9 QUESTIONS 1 & 2: 0
2. FEELING DOWN, DEPRESSED OR HOPELESS: 0
SUM OF ALL RESPONSES TO PHQ QUESTIONS 1-9: 0
1. LITTLE INTEREST OR PLEASURE IN DOING THINGS: 0

## 2023-09-13 ASSESSMENT — LIFESTYLE VARIABLES
HOW OFTEN DO YOU HAVE SIX OR MORE DRINKS ON ONE OCCASION: 1
HOW OFTEN DO YOU HAVE A DRINK CONTAINING ALCOHOL: MONTHLY OR LESS
HOW OFTEN DO YOU HAVE A DRINK CONTAINING ALCOHOL: 2
HOW MANY STANDARD DRINKS CONTAINING ALCOHOL DO YOU HAVE ON A TYPICAL DAY: 0
HOW MANY STANDARD DRINKS CONTAINING ALCOHOL DO YOU HAVE ON A TYPICAL DAY: PATIENT DOES NOT DRINK

## 2023-09-13 NOTE — PROGRESS NOTES
Subjective:     Melisa Mosley is a 77 y.o. female who presents for follow up of hypertension and hyperlipidemia and elevated blood sugars. Diet and Lifestyle: has restarted walking. Not really limiting her salt. Home BP Monitoring: nml at other doctor offices. Cardiovascular ROS: taking medications as instructed, no medication side effects noted, no TIA's, no chest pain on exertion, no dyspnea on exertion, no swelling of ankles, no orthostatic dizziness or lightheadedness, no palpitations. New concerns:   Seeing orthopedist for her knees. Started with injections and PT but did not help. Had 3 opinion. First 2 recommend TKR bilat. Then head of Ortho at [facility] did an MRI - torn meniscus and debris on right. Had surgical repair of meniscus mid July. Right knee feels better - worse after standing for a while, stiff after sitting. Told may eventually need TKR on left. Also had CT repair left wrist. Fall 2022 by Dr. Arley Sanchez. Moderate CTS on right. Had episode of vertigo after stopping Oxycodone for knee surgery. Lasted approx 2 weeks. In bed for a couple of days. Has not recurred. Lipoma  right upper arm is growing. Growth left upper lateral lower leg. Insect bite which has not resolved.       Current Outpatient Medications   Medication Sig Dispense Refill    turmeric (QC TUMERIC COMPLEX) 500 MG CAPS Take 1 capsule by mouth daily      celecoxib (CELEBREX) 200 MG capsule Take 1 capsule by mouth daily      simvastatin (ZOCOR) 40 MG tablet TAKE 1 TABLET BY MOUTH EVERY DAY AT NIGHT 90 tablet 3    acetaminophen (TYLENOL) 650 MG extended release tablet Take 1 tablet by mouth 2 times daily as needed      Cetirizine HCl 10 MG CAPS Take 5 mg by mouth daily      vitamin D 25 MCG (1000 UT) CAPS Take 5 capsules by mouth daily      DULoxetine (CYMBALTA) 60 MG extended release capsule Take 1 tablet by mouth daily      hydroCHLOROthiazide (HYDRODIURIL) 25 MG tablet Take 1 tablet by mouth daily

## 2023-09-14 ENCOUNTER — OFFICE VISIT (OUTPATIENT)
Age: 68
End: 2023-09-14
Payer: MEDICARE

## 2023-09-14 VITALS
RESPIRATION RATE: 18 BRPM | OXYGEN SATURATION: 98 % | WEIGHT: 207.2 LBS | TEMPERATURE: 97.9 F | SYSTOLIC BLOOD PRESSURE: 124 MMHG | HEIGHT: 64 IN | DIASTOLIC BLOOD PRESSURE: 70 MMHG | HEART RATE: 81 BPM | BODY MASS INDEX: 35.37 KG/M2

## 2023-09-14 DIAGNOSIS — I10 ESSENTIAL (PRIMARY) HYPERTENSION: ICD-10-CM

## 2023-09-14 DIAGNOSIS — E55.9 VITAMIN D DEFICIENCY: ICD-10-CM

## 2023-09-14 DIAGNOSIS — G89.29 CHRONIC PAIN OF BOTH KNEES: ICD-10-CM

## 2023-09-14 DIAGNOSIS — Z23 NEEDS FLU SHOT: ICD-10-CM

## 2023-09-14 DIAGNOSIS — E66.01 SEVERE OBESITY (HCC): ICD-10-CM

## 2023-09-14 DIAGNOSIS — M25.562 CHRONIC PAIN OF BOTH KNEES: ICD-10-CM

## 2023-09-14 DIAGNOSIS — E78.00 PURE HYPERCHOLESTEROLEMIA, UNSPECIFIED: ICD-10-CM

## 2023-09-14 DIAGNOSIS — Z00.00 MEDICARE ANNUAL WELLNESS VISIT, SUBSEQUENT: Primary | ICD-10-CM

## 2023-09-14 DIAGNOSIS — D17.21 LIPOMA OF RIGHT UPPER EXTREMITY: ICD-10-CM

## 2023-09-14 DIAGNOSIS — R73.9 HYPERGLYCEMIA, UNSPECIFIED: ICD-10-CM

## 2023-09-14 DIAGNOSIS — M25.561 CHRONIC PAIN OF BOTH KNEES: ICD-10-CM

## 2023-09-14 PROCEDURE — 99214 OFFICE O/P EST MOD 30 MIN: CPT | Performed by: INTERNAL MEDICINE

## 2023-09-14 PROCEDURE — PBSHW INFLUENZA, FLUAD, (AGE 65 Y+), IM, PF, 0.5 ML: Performed by: INTERNAL MEDICINE

## 2023-09-14 PROCEDURE — 3074F SYST BP LT 130 MM HG: CPT | Performed by: INTERNAL MEDICINE

## 2023-09-14 PROCEDURE — 1036F TOBACCO NON-USER: CPT | Performed by: INTERNAL MEDICINE

## 2023-09-14 PROCEDURE — G8400 PT W/DXA NO RESULTS DOC: HCPCS | Performed by: INTERNAL MEDICINE

## 2023-09-14 PROCEDURE — G0439 PPPS, SUBSEQ VISIT: HCPCS | Performed by: INTERNAL MEDICINE

## 2023-09-14 PROCEDURE — 90694 VACC AIIV4 NO PRSRV 0.5ML IM: CPT | Performed by: INTERNAL MEDICINE

## 2023-09-14 PROCEDURE — 1090F PRES/ABSN URINE INCON ASSESS: CPT | Performed by: INTERNAL MEDICINE

## 2023-09-14 PROCEDURE — G0008 ADMIN INFLUENZA VIRUS VAC: HCPCS | Performed by: INTERNAL MEDICINE

## 2023-09-14 PROCEDURE — 1123F ACP DISCUSS/DSCN MKR DOCD: CPT | Performed by: INTERNAL MEDICINE

## 2023-09-14 PROCEDURE — 3017F COLORECTAL CA SCREEN DOC REV: CPT | Performed by: INTERNAL MEDICINE

## 2023-09-14 PROCEDURE — 3078F DIAST BP <80 MM HG: CPT | Performed by: INTERNAL MEDICINE

## 2023-09-14 PROCEDURE — G8427 DOCREV CUR MEDS BY ELIG CLIN: HCPCS | Performed by: INTERNAL MEDICINE

## 2023-09-14 PROCEDURE — G8417 CALC BMI ABV UP PARAM F/U: HCPCS | Performed by: INTERNAL MEDICINE

## 2023-09-14 RX ORDER — VIT C/B6/B5/MAGNESIUM/HERB 173 50-5-6-5MG
1 CAPSULE ORAL DAILY
COMMUNITY
Start: 2023-09-04

## 2023-09-14 RX ORDER — CELECOXIB 200 MG/1
200 CAPSULE ORAL DAILY
COMMUNITY
Start: 2023-03-27

## 2023-09-14 ASSESSMENT — PATIENT HEALTH QUESTIONNAIRE - PHQ9
SUM OF ALL RESPONSES TO PHQ QUESTIONS 1-9: 0
SUM OF ALL RESPONSES TO PHQ QUESTIONS 1-9: 0
SUM OF ALL RESPONSES TO PHQ9 QUESTIONS 1 & 2: 0
1. LITTLE INTEREST OR PLEASURE IN DOING THINGS: 0
2. FEELING DOWN, DEPRESSED OR HOPELESS: 0
SUM OF ALL RESPONSES TO PHQ QUESTIONS 1-9: 0
SUM OF ALL RESPONSES TO PHQ QUESTIONS 1-9: 0

## 2023-09-15 LAB
25(OH)D3 SERPL-MCNC: 60 NG/ML (ref 30–100)
ALBUMIN SERPL-MCNC: 3.8 G/DL (ref 3.5–5)
ALBUMIN/GLOB SERPL: 1 (ref 1.1–2.2)
ALP SERPL-CCNC: 107 U/L (ref 45–117)
ALT SERPL-CCNC: 23 U/L (ref 12–78)
ANION GAP SERPL CALC-SCNC: 2 MMOL/L (ref 5–15)
AST SERPL-CCNC: 23 U/L (ref 15–37)
BILIRUB SERPL-MCNC: 0.4 MG/DL (ref 0.2–1)
BUN SERPL-MCNC: 19 MG/DL (ref 6–20)
BUN/CREAT SERPL: 18 (ref 12–20)
CALCIUM SERPL-MCNC: 9.7 MG/DL (ref 8.5–10.1)
CHLORIDE SERPL-SCNC: 97 MMOL/L (ref 97–108)
CHOLEST SERPL-MCNC: 206 MG/DL
CO2 SERPL-SCNC: 36 MMOL/L (ref 21–32)
CREAT SERPL-MCNC: 1.04 MG/DL (ref 0.55–1.02)
EST. AVERAGE GLUCOSE BLD GHB EST-MCNC: 131 MG/DL
GLOBULIN SER CALC-MCNC: 3.7 G/DL (ref 2–4)
GLUCOSE SERPL-MCNC: 112 MG/DL (ref 65–100)
HBA1C MFR BLD: 6.2 % (ref 4–5.6)
HDLC SERPL-MCNC: 60 MG/DL
HDLC SERPL: 3.4 (ref 0–5)
LDLC SERPL CALC-MCNC: 121.8 MG/DL (ref 0–100)
POTASSIUM SERPL-SCNC: 4.1 MMOL/L (ref 3.5–5.1)
PROT SERPL-MCNC: 7.5 G/DL (ref 6.4–8.2)
SODIUM SERPL-SCNC: 135 MMOL/L (ref 136–145)
TRIGL SERPL-MCNC: 121 MG/DL
VLDLC SERPL CALC-MCNC: 24.2 MG/DL

## 2023-10-30 ENCOUNTER — TELEPHONE (OUTPATIENT)
Age: 68
End: 2023-10-30

## 2023-10-30 NOTE — TELEPHONE ENCOUNTER
----- Message from Anthony Harmon sent at 10/20/2023  1:36 PM EDT -----  Subject: Message to Provider    QUESTIONS  Information for Provider? Patient would like to speak to the nurse or the   . I am not able to get through. Please call this patient.  ---------------------------------------------------------------------------  --------------  Ashley Priti ASIA  [phone]; OK to leave message on voicemail  ---------------------------------------------------------------------------  --------------  SCRIPT ANSWERS  Relationship to Patient? 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 She has decided to move to a fully  practice outside of [facility]. We have three providers taking on her patients. Dr. Enrrique Griffith, Demetri Hill [facility], and Mari Kennedy, PA. Please call the office to get an appointment to reschedule with one of these providers or you can call [phone] to find a [facility] provider at another practice.     Dr. Ximena Case recommended list of providers in [address] [place] [address] Internal Medicine [phone]  Linda Francis, [address] [place] [address] [phone]  MD Audi Doyle MD Marnie Jacob, MD Marcos Fare, MD Rita Goes, MD    If you are interested, please contact that office for more information about how to establish with her new practice:  [facility]  Phone: [phone]

## 2023-11-06 ENCOUNTER — TELEPHONE (OUTPATIENT)
Age: 68
End: 2023-11-06

## 2023-11-07 NOTE — TELEPHONE ENCOUNTER
Returned call to patient regarding her phone call through the [facility] ([facility]) requesting to speak to a nurse. No answer. Left voice mail for return call.

## 2024-05-26 ENCOUNTER — HOSPITAL ENCOUNTER (EMERGENCY)
Facility: HOSPITAL | Age: 69
Discharge: HOME | End: 2024-05-26
Attending: STUDENT IN AN ORGANIZED HEALTH CARE EDUCATION/TRAINING PROGRAM
Payer: MEDICARE

## 2024-05-26 ENCOUNTER — APPOINTMENT (OUTPATIENT)
Facility: HOSPITAL | Age: 69
End: 2024-05-26
Payer: MEDICARE

## 2024-05-26 VITALS
HEART RATE: 88 BPM | HEIGHT: 64 IN | WEIGHT: 199.3 LBS | SYSTOLIC BLOOD PRESSURE: 170 MMHG | OXYGEN SATURATION: 98 % | RESPIRATION RATE: 18 BRPM | DIASTOLIC BLOOD PRESSURE: 79 MMHG | TEMPERATURE: 98.4 F | BODY MASS INDEX: 34.02 KG/M2
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
## 2024-05-26 DIAGNOSIS — S02.401A CLOSED FRACTURE OF MAXILLARY SINUS, INITIAL ENCOUNTER (HCC): ICD-10-CM

## 2024-05-26 DIAGNOSIS — S02.2XXA CLOSED FRACTURE OF NASAL BONE, INITIAL ENCOUNTER: Primary | ICD-10-CM

## 2024-05-26 PROCEDURE — 70486 CT MAXILLOFACIAL W/O DYE: CPT

## 2024-05-26 PROCEDURE — 70450 CT HEAD/BRAIN W/O DYE: CPT

## 2024-05-26 PROCEDURE — 99284 EMERGENCY DEPT VISIT MOD MDM: CPT

## 2024-05-26 RX ORDER — AMOXICILLIN AND CLAVULANATE POTASSIUM 875; 125 MG/1; MG/1
1 TABLET, FILM COATED ORAL 2 TIMES DAILY
Qty: 28 TABLET | Refills: 0 | Status: SHIPPED | OUTPATIENT
Start: 2024-05-26 | End: 2024-06-09

## 2024-05-26 RX ORDER — OXYCODONE HYDROCHLORIDE 5 MG/1
5 TABLET ORAL EVERY 6 HOURS PRN
Qty: 12 TABLET | Refills: 0 | Status: SHIPPED | OUTPATIENT
Start: 2024-05-26 | End: 2024-05-26

## 2024-05-26 RX ORDER — OXYCODONE HYDROCHLORIDE 5 MG/1
5 TABLET ORAL EVERY 6 HOURS PRN
Qty: 12 TABLET | Refills: 0 | Status: SHIPPED | OUTPATIENT
Start: 2024-05-26 | End: 2024-05-29

## 2024-05-26 ASSESSMENT — PAIN - FUNCTIONAL ASSESSMENT: PAIN_FUNCTIONAL_ASSESSMENT: 0-10

## 2024-05-26 ASSESSMENT — PAIN SCALES - GENERAL: PAINLEVEL_OUTOF10: 5

## 2024-05-26 NOTE — ED TRIAGE NOTES
Mechnical fall tripping over her dog falling forward landing on her face with nasal swelling and pain. No thinners, no loc, bleeding controlled.

## 2024-05-27 NOTE — ED PROVIDER NOTES
fracture. No acute intracranial abnormality.       CT MAXILLOFACIAL WO CONTRAST   Final Result   Acute bilateral nasal bone fractures. Acute right posterior maxillary sinus wall   fracture. No acute intracranial abnormality.            LABS:  Labs Reviewed - No data to display    All other labs were within normal range or not returned as of this dictation.    EMERGENCY DEPARTMENT COURSE and DIFFERENTIAL DIAGNOSIS/MDM:   Vitals:    Vitals:    05/26/24 1945 05/26/24 1948 05/26/24 2000   BP: (!) 170/79 (!) 170/79    Pulse:  88    Resp:  18    Temp:  98.4 °F (36.9 °C)    TempSrc:  Oral    SpO2:  97% 98%   Weight:  90.4 kg (199 lb 4.7 oz)    Height:  1.626 m (5' 4\")            Medical Decision Making  69-year-old female presents to the ER for evaluation after fall with blunt facial trauma.  Patient reports tripping over her dog, and falling forward landing directly on her face. She denies LOC. Reports epistaxis from bilateral nares immediately after, however, epistxis has since resolved. She currently reports pain over her nose. Denies headache, vision changes, confusion, dizziness, facial droop, weakness, numbness, incoordination, gait disturbances, speech deficits. She is not on AC.     Patient is hypertensive in the ER, all other vitals wnl. Exam as above. Suspect nasal fx. Will check CT head, CT maxillofacial.         Amount and/or Complexity of Data Reviewed  Radiology: ordered.    Risk  Prescription drug management.            REASSESSMENT      CT head negative for acute process. CT maxillofacial show bilateral nasal fx, along with R posterior maxillary sinus fracture. She is not experiencing significant pain over her R maxilla surprisingly. Results of imaging discussed with the pt and . Discussed nasal precautions. Will provide rx for pain medication along with augmentin for infection prophylaxis. Referral to ENT for definitive management. She felt comfortable with plan for discharge.

## 2024-11-25 NOTE — PROGRESS NOTES
Subjective:   Eliot Begum is a 75 y.o. female who is seen for follow up of hyperlipidemia and elevated blood sugar. Cardiovascular risk analysis - 75 y.o. female LDL goal is under 130. Compliance with treatment thus far has been good. ROS: taking medications as instructed, no medication side effects noted, no TIA's, no chest pain on exertion, no dyspnea on exertion, no swelling of ankles, no orthostatic dizziness or lightheadedness, no palpitations. New concerns:   Elevated blood sugar. Trying to watch diet and exercise for weight loss. Daughter getting  in December - motivated to lose weight. Denies changes in thirst, urination, or vision. No numbness in feet. Cymbalta is controlling her anxiety. Current Outpatient Medications   Medication Sig Dispense Refill    simvastatin (ZOCOR) 20 mg tablet TAKE 1 TABLET BY MOUTH EVERY EVENING 90 Tab 3    acetaminophen (TYLENOL ARTHRITIS PAIN) 650 mg TbER Take 650 mg by mouth two (2) times a day.  DULoxetine (CYMBALTA) 60 mg capsule TAKE ONE CAPSULE BY MOUTH EVERY DAY 30 Cap 11    Cetirizine (ZYRTEC) 10 mg cap Take  by mouth.  omeprazole (PRILOSEC) 20 mg capsule Take 20 mg by mouth daily. As needed      triamcinolone (NASACORT AQ) 55 mcg nasal inhaler 2 Sprays daily.  Cholecalciferol, Vitamin D3, (VITAMIN D3) 1,000 unit cap Take 4,000 Units by mouth daily.  aspirin 81 mg tablet Take 81 mg by mouth.         Lab Results   Component Value Date/Time    Hemoglobin A1c 6.1 (H) 10/03/2018 10:18 AM    Hemoglobin A1c 5.9 (H) 03/07/2018 09:04 AM    Hemoglobin A1c 5.9 (H) 09/08/2017 11:21 AM    Glucose 116 (H) 10/03/2018 10:18 AM    LDL, calculated 137 (H) 10/03/2018 10:18 AM    Creatinine 0.89 10/03/2018 10:18 AM      Lab Results   Component Value Date/Time    Cholesterol, total 214 (H) 10/03/2018 10:18 AM    HDL Cholesterol 57 10/03/2018 10:18 AM    LDL, calculated 137 (H) 10/03/2018 10:18 AM    Triglyceride 101 10/03/2018 10:18 AM What Type Of Note Output Would You Prefer (Optional)?: Standard Output Is The Patient Presenting As Previously Scheduled?: Yes Lab Results   Component Value Date/Time    ALT (SGPT) 30 10/03/2018 10:18 AM    AST (SGOT) 30 10/03/2018 10:18 AM    Alk. phosphatase 104 10/03/2018 10:18 AM    Bilirubin, direct 0.09 06/24/2015 10:03 AM    Bilirubin, total 0.4 10/03/2018 10:18 AM    Albumin 4.6 10/03/2018 10:18 AM    Protein, total 7.9 10/03/2018 10:18 AM    PLATELET 692 65/69/9315 10:18 AM     Lab Results   Component Value Date/Time    GFR est non-AA 68 10/03/2018 10:18 AM    GFR est AA 79 10/03/2018 10:18 AM    Creatinine 0.89 10/03/2018 10:18 AM    BUN 18 10/03/2018 10:18 AM    Sodium 141 10/03/2018 10:18 AM    Potassium 5.0 10/03/2018 10:18 AM    Chloride 99 10/03/2018 10:18 AM    CO2 25 10/03/2018 10:18 AM        Objective:     Visit Vitals  /82   Pulse 72   Temp 98.2 °F (36.8 °C) (Oral)   Ht 5' 4\" (1.626 m)   Wt 225 lb 9.6 oz (102.3 kg)   SpO2 99%   BMI 38.72 kg/m²      Appearance: alert, well appearing, and in no distress and oriented to person, place, and time. CVS exam   .NECK: supple, no lad, no bruit, no tm  LUNGS: cta bilat  CV rrr, no m/g/r  ABD: soft, nt, nd, nabs  EXT: no c/c/e    Assessment/Plan:   Hyperlipidemia well controlled. current treatment plan is effective, no change in therapy. Elevated blood sugar - discussed better diet and exercise  Check labs    Severe obesity - diet and exercise for weight loss. Orders Placed This Encounter    METABOLIC PANEL, COMPREHENSIVE    LIPID PANEL    HEMOGLOBIN A1C WITH EAG       . How Severe Is Your Rash?: mild Is This A New Presentation, Or A Follow-Up?: Rash Additional History: Patient stated he has used fluticasone, which has been slightly helpful.

## (undated) DEVICE — DRAPE,REIN 53X77,STERILE: Brand: MEDLINE

## (undated) DEVICE — BNDG ELAS HK LOOP 3X5YD NS -- MATRIX

## (undated) DEVICE — MINOR BASIN -SMH: Brand: MEDLINE INDUSTRIES, INC.

## (undated) DEVICE — SOLUTION IRRIG 1000ML 0.9% SOD CHL USP POUR PLAS BTL

## (undated) DEVICE — PREP SKN CHLRAPRP APL 26ML STR --

## (undated) DEVICE — HYPODERMIC SAFETY NEEDLE: Brand: MONOJECT

## (undated) DEVICE — STANDARD (U) BLADE ASSEMBLY 1PK: Brand: MICROAIRE®

## (undated) DEVICE — DRAPE,HAND,STERILE: Brand: MEDLINE

## (undated) DEVICE — KIT,ANTI FOG,W/SPONGE & FLUID,SOFT PACK: Brand: MEDLINE

## (undated) DEVICE — SYR 10ML LUER LOK 1/5ML GRAD --

## (undated) DEVICE — DRESSING,GAUZE,XEROFORM,CURAD,1"X8",ST: Brand: CURAD

## (undated) DEVICE — SPONGE GZ W4XL4IN COT 12 PLY TYP VII WVN C FLD DSGN

## (undated) DEVICE — BIPOLAR FORCEPS CORD: Brand: VALLEYLAB

## (undated) DEVICE — BANDAGE,ELASTIC,ESMARK,STERILE,4"X9',LF: Brand: MEDLINE

## (undated) DEVICE — PADDING CAST W4INXL4YD ST COT RAYON MICROPLEATED HIGHLY

## (undated) DEVICE — SUT ETHLN 4-0 18IN PS2 BLK --

## (undated) DEVICE — DISPOSABLE TOURNIQUET CUFF SINGLE BLADDER, DUAL PORT AND QUICK CONNECT CONNECTOR: Brand: COLOR CUFF

## (undated) DEVICE — HANDLE LT SNAP ON ULT DURABLE LENS FOR TRUMPF ALC DISPOSABLE